# Patient Record
Sex: FEMALE | Race: WHITE | NOT HISPANIC OR LATINO | Employment: FULL TIME | URBAN - METROPOLITAN AREA
[De-identification: names, ages, dates, MRNs, and addresses within clinical notes are randomized per-mention and may not be internally consistent; named-entity substitution may affect disease eponyms.]

---

## 2017-04-16 ENCOUNTER — TELEPHONE (OUTPATIENT)
Dept: NURSING | Facility: CLINIC | Age: 47
End: 2017-04-16

## 2017-04-17 NOTE — TELEPHONE ENCOUNTER
Call Type: Triage Call    Presenting Problem: Solid red spot noted today in right eye. Pt  states she has coughed a lot today and moved/lifted heavy items.  Triage Note:  Guideline Title: Eye: Other Problems  Recommended Disposition: Provide Home/Self Care  Original Inclination: Wanted to speak with a nurse  Override Disposition:  Intended Action: Follow advice given  Physician Contacted: No  Bright red spot in white of eye following sneezing, minor trauma, or occurring  spontaneously ?  YES  Feeling of dry eyes with stinging, burning, or strained vision ? NO  Eye injury, possible foreign body, OR chemical splash ? NO  Sudden or severe eye pain and no injury ? NO  Sudden vision change ? NO  Gradual onset of decreased or absent blinking AND not previously evaluated ? NO  Repetitive, uncontrolled twitching or spasms of the eyelid(s) lasting more than 3  days AND not previously evaluated ? NO  Bulging of eyes AND not previously evaluated ? NO  Sudden vision change associated with new difficulty speaking/swallowing; using an  arm/leg; or new one-sided weakness (face drooping) ? NO  Chronic drooping of upper eyelid affecting vision AND not previously evaluated ? NO  Cut on or near eyelid OR near tear duct (corner of eye closest to nose) ? NO  Cuts, abrasions or puncture wound on face ? NO  Physician Instructions:  Care Advice: See provider immediately if you note increasing pain, any  change in vision, or light sensitivity.  SYMPTOM / CONDITION MANAGEMENT  Usually no tests or treatment are necessary. A broken blood vessel can be  caused by severe sneezing, coughing, vomiting or heavy lifting. Risk is  increased if having a medical history of diabetes, high blood pressure,  blood clotting disorder or if taking blood thinning medication.  See provider during regular office hours if blood does not disappear within  2 weeks, if it recurs or if you notice spontaneous bleeding or bruising in  other areas.

## 2017-05-31 ENCOUNTER — OFFICE VISIT (OUTPATIENT)
Dept: OBGYN | Facility: CLINIC | Age: 47
End: 2017-05-31
Payer: COMMERCIAL

## 2017-05-31 ENCOUNTER — RADIANT APPOINTMENT (OUTPATIENT)
Dept: MAMMOGRAPHY | Facility: CLINIC | Age: 47
End: 2017-05-31
Payer: COMMERCIAL

## 2017-05-31 VITALS
WEIGHT: 287 LBS | DIASTOLIC BLOOD PRESSURE: 78 MMHG | BODY MASS INDEX: 46.12 KG/M2 | HEART RATE: 80 BPM | HEIGHT: 66 IN | SYSTOLIC BLOOD PRESSURE: 122 MMHG

## 2017-05-31 DIAGNOSIS — Z12.31 VISIT FOR SCREENING MAMMOGRAM: ICD-10-CM

## 2017-05-31 DIAGNOSIS — Z11.51 SCREENING FOR HUMAN PAPILLOMAVIRUS: ICD-10-CM

## 2017-05-31 DIAGNOSIS — Z01.419 ENCOUNTER FOR GYNECOLOGICAL EXAMINATION WITHOUT ABNORMAL FINDING: Primary | ICD-10-CM

## 2017-05-31 DIAGNOSIS — N39.41 URGENCY INCONTINENCE: ICD-10-CM

## 2017-05-31 PROCEDURE — 99396 PREV VISIT EST AGE 40-64: CPT | Performed by: OBSTETRICS & GYNECOLOGY

## 2017-05-31 PROCEDURE — G0124 SCREEN C/V THIN LAYER BY MD: HCPCS | Performed by: OBSTETRICS & GYNECOLOGY

## 2017-05-31 PROCEDURE — 77063 BREAST TOMOSYNTHESIS BI: CPT | Mod: TC

## 2017-05-31 PROCEDURE — G0202 SCR MAMMO BI INCL CAD: HCPCS | Mod: TC

## 2017-05-31 PROCEDURE — G0145 SCR C/V CYTO,THINLAYER,RESCR: HCPCS | Performed by: OBSTETRICS & GYNECOLOGY

## 2017-05-31 RX ORDER — MIRABEGRON 25 MG/1
25 TABLET, FILM COATED, EXTENDED RELEASE ORAL DAILY
Qty: 30 TABLET | Refills: 3 | Status: SHIPPED | OUTPATIENT
Start: 2017-05-31 | End: 2017-12-29

## 2017-05-31 ASSESSMENT — ANXIETY QUESTIONNAIRES
5. BEING SO RESTLESS THAT IT IS HARD TO SIT STILL: NOT AT ALL
6. BECOMING EASILY ANNOYED OR IRRITABLE: NOT AT ALL
7. FEELING AFRAID AS IF SOMETHING AWFUL MIGHT HAPPEN: NOT AT ALL
3. WORRYING TOO MUCH ABOUT DIFFERENT THINGS: NOT AT ALL
1. FEELING NERVOUS, ANXIOUS, OR ON EDGE: NOT AT ALL
GAD7 TOTAL SCORE: 0
2. NOT BEING ABLE TO STOP OR CONTROL WORRYING: NOT AT ALL

## 2017-05-31 ASSESSMENT — PATIENT HEALTH QUESTIONNAIRE - PHQ9: 5. POOR APPETITE OR OVEREATING: NOT AT ALL

## 2017-05-31 NOTE — LETTER
June 9, 2017    Genesis Carnes  [address] [address]    Dear Genesis,  We are happy to inform you that your PAP smear result from 5/31/17 is normal.  We are now able to do a follow up test on PAP smears. The DNA test is for HPV (Human Papilloma Virus). Cervical cancer is closely linked with certain types of HPV. Your result showed no evidence of high risk HPV.  Therefore we recommend you return in 3 years for your next pap smear and HPV test.  You will still need to return to the clinic every year for an annual exam and other preventive tests.  Please contact the clinic at [phone] with any questions.  Sincerely,    Judson Elliott MD/elva

## 2017-05-31 NOTE — MR AVS SNAPSHOT
"              After Visit Summary   2017    Genesis Carnes    MRN: [id number]           Patient Information     Date Of Birth          1970        Visit Information        Provider Department      2017 9:00 AM Judson Elliott MD [facility]        Today's Diagnoses     Encounter for gynecological examination without abnormal finding    -  1    Urgency incontinence           Follow-ups after your visit        Who to contact     If you have questions or need follow up information about today's clinic visit or your schedule please contact [facility] directly at [phone].  Normal or non-critical lab and imaging results will be communicated to you by [employer], letter or phone within 4 business days after the clinic has received the results. If you do not hear from us within 7 days, please contact the clinic through [employer] or phone. If you have a critical or abnormal lab result, we will notify you by phone as soon as possible.  Submit refill requests through [employer] or call your pharmacy and they will forward the refill request to us. Please allow 3 business days for your refill to be completed.          Additional Information About Your Visit        MyChart Information     Splash lets you send messages to your doctor, view your test results, renew your prescriptions, schedule appointments and more. To sign up, go to [email or web address] . Click on \"Log in\" on the left side of the screen, which will take you to the Welcome page. Then click on \"Sign up Now\" on the right side of the page.     You will be asked to enter the access code listed below, as well as some personal information. Please follow the directions to create your username and password.     Your access code is: [email or web address]  Expires: 2017  8:55 AM     Your access code will  in 90 days. If you need help or a new code, please call your [facility] or [phone].        Care " "EveryWhere ID     This is your Care EveryWhere ID. This could be used by other organizations to access your [facility] medical records  [id number]        Your Vitals Were     Pulse Height Last Period BMI (Body Mass Index)          80 1.676 m (5' 6\") 05/17/2017 46.32 kg/m2         Blood Pressure from Last 3 Encounters:   05/31/17 122/78   01/19/16 141/80   06/18/14 120/59    Weight from Last 3 Encounters:   05/31/17 130.2 kg (287 lb)   01/19/16 113.4 kg (250 lb)   06/17/14 113.4 kg (250 lb)              We Performed the Following     Pap imaged thin layer screen reflex to HPV if ASCUS - recommended age 25 - 29 years          Today's Medication Changes          These changes are accurate as of: 5/31/17  9:41 AM.  If you have any questions, ask your nurse or doctor.               Start taking these medicines.        Dose/Directions    mirabegron 25 MG 24 hr tablet   Commonly known as:  MYRBETRIQ   Used for:  Urgency incontinence   Started by:  Judson Elliott MD        Dose:  25 mg   Take 1 tablet (25 mg) by mouth daily   Quantity:  30 tablet   Refills:  3            Where to get your medicines      These medications were sent to [place] [facility] [address] [place], [place] - [address]  [address] [facility] [address]     Phone:  [phone]     mirabegron 25 MG 24 hr tablet                Primary Care Provider    None Specified       No primary provider on file.        Thank you!     Thank you for choosing [facility] FOR [facility]  for your care. Our goal is always to provide you with excellent care. Hearing back from our patients is one way we can continue to improve our services. Please take a few minutes to complete the written survey that you may receive in the mail after your visit with us. Thank you!             Your Updated Medication List - Protect others around you: Learn how to safely use, store and throw away your medicines at www.disposemymeds.org.          This list is accurate as of: 5/31/17  9:41 AM. "  Always use your most recent med list.                   Brand Name Dispense Instructions for use    CELEXA PO          HUMALOG SC          HYDROCHLOROTHIAZIDE PO          LISINOPRIL PO          mirabegron 25 MG 24 hr tablet    MYRBETRIQ    30 tablet    Take 1 tablet (25 mg) by mouth daily       VITAMIN D (CHOLECALCIFEROL) PO      Take 5,000 Units by mouth daily

## 2017-05-31 NOTE — LETTER
June 14, 2017    Genesis Carnes  [address] [address]    Dear Genesis,  We are happy to inform you that your PAP smear result from 5/31/17 is normal.  The presence of endometrial cells was seen on your current pap smear.  This is most likely due to your menstrual cycle.  No follow up is recommended unless you have irregular periods or spotting/bleeding in between your cycles.  If this occurs, please contact the clinic for a follow up appointment.   We are now able to do a follow up test on PAP smears. The DNA test is for HPV (Human Papilloma Virus). Cervical cancer is closely linked with certain types of HPV. Your result showed no evidence of high risk HPV.  Therefore we recommend you return in 1 year for your next pap smear.  You will still need to return to the clinic every year for an annual exam and other preventive tests.  Please contact the clinic at [phone] with any questions.  Sincerely,    Judson Elliott MD/elva

## 2017-05-31 NOTE — PROGRESS NOTES
Genesis is a 47 year old No obstetric history on file. female who presents for annual exam.     Besides routine health maintenance, discuss bladder botox.    HPI: The patient is seen at this time for her annual update. She has not been seen in years. She has good diabetic control with low hemoglobin A1c. Unfortunately she has gone up and weight. She does have some urgency and stress incontinence. She does still have caffeine in her system.Pelvic Exam:    The patient sees a PCP at [facility].        GYNECOLOGIC HISTORY:    Patient's last menstrual period was 05/17/2017.  Her current contraception method is: none.  She  reports that she has never smoked. She does not have any smokeless tobacco history on file.    Patient is not sexually active.  STD testing offered?  Declined  Last PHQ-9 score on record =   PHQ-9 SCORE 5/31/2017   Total Score 2     Last GAD7 score on record =   PREM-7 SCORE 5/31/2017   Total Score 0     Alcohol Score = 1    HEALTH MAINTENANCE:  Cholesterol: couple mths ago-normal  Last Mammo: 2 years ago, Result: normal, Next Mammo:  today  Pap: (  Lab Results   Component Value Date    PAP NIL 10/16/2015     Colonoscopy:  4.5 yrs ago, Result: normal, Next Colonoscopy: 1 years.  Dexa:  never    Health maintenance updated:  no    HISTORY:  Obstetric History     No data available          There is no problem list on file for this patient.    No past surgical history on file.   Social History   Substance Use Topics     Smoking status: Never Smoker     Smokeless tobacco: Not on file     Alcohol use Yes      Comment: rarely           Current Outpatient Prescriptions   Medication Sig     HYDROCHLOROTHIAZIDE PO      VITAMIN D, CHOLECALCIFEROL, PO Take 5,000 Units by mouth daily     Insulin Lispro, Human, (HUMALOG SC)      LISINOPRIL PO      Citalopram Hydrobromide (CELEXA PO)      No current facility-administered medications for this visit.      No Known Allergies    Past medical, surgical, social and family  "histories were reviewed and updated in EPIC.    ROS:   12 point review of systems negative other than symptoms noted below.  Constitutional: Weight Gain  Genitourinary: Incontinence    EXAM:  /78  Pulse 80  Ht 5' 6\" (1.676 m)  Wt 287 lb (130.2 kg)  LMP 05/17/2017  BMI 46.32 kg/m2   BMI: Body mass index is 46.32 kg/(m^2).    PHYSICAL EXAM:  Constitutional:  Appearance: Well nourished, well developed, alert, in no acute distress  Neck:  Lymph Nodes:  No lymphadenopathy present    Thyroid:  Gland size normal, nontender, no nodules or masses present  on palpation  Chest:  Respiratory Effort:  Breathing unlabored  Cardiovascular:    Heart: Auscultation:  Regular rate, normal rhythm, no murmurs present  Breasts: Inspection of Breasts:  No lymphadenopathy present    Palpation of Breasts and Axillae:  No masses present on palpation, no  breast tenderness    Axillary Lymph Nodes:  No lymphadenopathy present  Gastrointestinal:   Abdominal Examination:  Abdomen nontender to palpation, tone normal without rigidity or guarding, no masses present, umbilicus without lesions   Liver and Spleen:  No hepatomegaly present, liver nontender to palpation    Hernias:  No hernias present  Lymphatic: Lymph Nodes:  No other lymphadenopathy present  Skin:  General Inspection:  No rashes present, no lesions present, no areas of  discoloration    Genitalia and Groin:  No rashes present, no lesions present, no areas of  discoloration, no masses present  Neurologic/Psychiatric:    Mental Status:  Oriented X3     Pelvic Exam:  External Genitalia:     Normal appearance for age, no discharge present, no tenderness present, no inflammatory lesions present, color normal  GOOD URETHRAL SUPPORT  Vagina:     Normal vaginal vault without central or paravaginal defects, no discharge present, no inflammatory lesions present, no masses present  Bladder:     Nontender to palpation  Urethra:   Urethral Body:  Urethra palpation normal, urethra " structural support normal   Urethral Meatus:  No erythema or lesions present  Cervix:     Appearance healthy, no lesions present, nontender to palpation, no bleeding present  Uterus:     Nontender to palpation, no masses present, position anteflexed, mobility: normal  Adnexa:     No adnexal tenderness present, no adnexal masses present  Perineum:     Perineum within normal limits, no evidence of trauma, no rashes or skin lesions present  Anus:     Anus within normal limits, no hemorrhoids present  Inguinal Lymph Nodes:     No lymphadenopathy present  Pubic Hair:     Normal pubic hair distribution for age  Genitalia and Groin:     No rashes present, no lesions present, no areas of discoloration, no masses present    COUNSELING:   Reviewed preventive health counseling, as reflected in patient instructions       Regular exercise       Healthy diet/nutrition    BMI: Body mass index is 46.32 kg/(m^2).  Weight management plan: Discussed healthy diet and exercise guidelines and patient will follow up in 12 months in clinic to re-evaluate.    ASSESSMENT:  47 year old female with satisfactory annual exam.    ICD-10-CM    1. Encounter for gynecological examination without abnormal finding Z01.419        PLAN: The patient is seen at this time for annual exam. Her health issues surrounding her weight. We will try some MYRBETRIQ for her urgency incontinence.      Judson Elliott MD

## 2017-05-31 NOTE — LETTER
June 14, 2017    Genesis Carnes  [address] [address]    Dear Genesis,  We are happy to inform you that your PAP smear result from 5/31/17 is normal.  We are now able to do a follow up test on PAP smears. The DNA test is for HPV (Human Papilloma Virus). Cervical cancer is closely linked with certain types of HPV. Your result showed no evidence of high risk HPV.  Therefore we recommend you return in 1 year for your next pap smear.  You will still need to return to the clinic every year for an annual exam and other preventive tests.  Please contact the clinic at [phone] with any questions.  Sincerely,    Judson Elliott MD/elva

## 2017-06-01 ASSESSMENT — PATIENT HEALTH QUESTIONNAIRE - PHQ9: SUM OF ALL RESPONSES TO PHQ QUESTIONS 1-9: 2

## 2017-06-01 ASSESSMENT — ANXIETY QUESTIONNAIRES: GAD7 TOTAL SCORE: 0

## 2017-06-05 LAB
COPATH REPORT: NORMAL
PAP: NORMAL

## 2017-06-06 PROCEDURE — 87624 HPV HI-RISK TYP POOLED RSLT: CPT | Performed by: OBSTETRICS & GYNECOLOGY

## 2017-06-08 LAB
FINAL DIAGNOSIS: NORMAL
HPV HR 12 DNA CVX QL NAA+PROBE: NEGATIVE
HPV16 DNA SPEC QL NAA+PROBE: NEGATIVE
HPV18 DNA SPEC QL NAA+PROBE: NEGATIVE
SPECIMEN DESCRIPTION: NORMAL

## 2017-12-28 ENCOUNTER — TELEPHONE (OUTPATIENT)
Dept: OBGYN | Facility: CLINIC | Age: 47
End: 2017-12-28

## 2017-12-28 DIAGNOSIS — R30.0 DYSURIA: Primary | ICD-10-CM

## 2017-12-28 NOTE — TELEPHONE ENCOUNTER
Pt needs a  Prior auth before her dexa scan on 2/5.  She would like a call back when it has been approved.

## 2017-12-29 RX ORDER — PHENAZOPYRIDINE HYDROCHLORIDE 100 MG/1
200 TABLET, FILM COATED ORAL 3 TIMES DAILY PRN
Qty: 12 TABLET | Refills: 1 | Status: SHIPPED | OUTPATIENT
Start: 2017-12-29 | End: 2018-01-02

## 2017-12-29 NOTE — TELEPHONE ENCOUNTER
Called pt back.     -pt no longer taking mirbetriq for her bladder as she felt it stopped her urine flow.    -she has c/o of bladder pain, dysruia, hotflashes, night sweats and feeling angry all the time. Her cycles have started to become irregular in the last 3 months.     I have sent pyridium for her dysuria. She needs to be seen in office.     She does not need a DEXA and I believe this was an error on our part.

## 2017-12-29 NOTE — TELEPHONE ENCOUNTER
Pt states insurance information is current. Reviewed with [facility] and she does not do PA's for dexa scan. Pt age 47. Dexa scan not ordered through this office. Is this something Dr. Elliott wants pt to have? Routing to Lisa Alvarado. Please advise.

## 2018-01-02 ENCOUNTER — OFFICE VISIT (OUTPATIENT)
Dept: OBGYN | Facility: CLINIC | Age: 48
End: 2018-01-02
Payer: COMMERCIAL

## 2018-01-02 VITALS — WEIGHT: 272 LBS | BODY MASS INDEX: 43.9 KG/M2 | DIASTOLIC BLOOD PRESSURE: 70 MMHG | SYSTOLIC BLOOD PRESSURE: 132 MMHG

## 2018-01-02 DIAGNOSIS — N94.3 PMS (PREMENSTRUAL SYNDROME): Primary | ICD-10-CM

## 2018-01-02 PROCEDURE — 99213 OFFICE O/P EST LOW 20 MIN: CPT | Performed by: NURSE PRACTITIONER

## 2018-01-02 RX ORDER — ATORVASTATIN CALCIUM 20 MG/1
TABLET, FILM COATED ORAL
COMMUNITY
Start: 2017-12-25 | End: 2023-09-16

## 2018-01-02 RX ORDER — ERGOCALCIFEROL 1.25 MG/1
CAPSULE, LIQUID FILLED ORAL
Refills: 1 | COMMUNITY
Start: 2017-11-21 | End: 2023-09-16

## 2018-01-02 RX ORDER — NORETHINDRONE ACETATE AND ETHINYL ESTRADIOL 1MG-20(21)
1 KIT ORAL DAILY
Qty: 84 TABLET | Refills: 3 | Status: SHIPPED | OUTPATIENT
Start: 2018-01-02 | End: 2019-07-25

## 2018-01-02 RX ORDER — PHENTERMINE HYDROCHLORIDE 37.5 MG/1
TABLET ORAL
Refills: 0 | COMMUNITY
Start: 2017-09-18 | End: 2018-01-02

## 2018-01-02 RX ORDER — MIRABEGRON 25 MG/1
TABLET, FILM COATED, EXTENDED RELEASE ORAL
Refills: 3 | COMMUNITY
Start: 2017-08-09 | End: 2018-01-02

## 2018-01-02 RX ORDER — FUROSEMIDE 20 MG
20 TABLET ORAL DAILY
Refills: 1 | COMMUNITY
Start: 2017-12-24

## 2018-01-02 NOTE — PROGRESS NOTES
SUBJECTIVE:                                                   Genesis Carnes is a 47 year old female who presents to clinic today for the following health issue(s):  Patient presents with:  Consult: hot flashes, and severe night sweats and mood swings      HPI:  Pt here today with complaints of hot flashes, night sweats, anger issues due to PMS. She has started to have irregular menses. She is s/p ablation.     Patient's last menstrual period was 12/24/2017 (exact date)..   Patient is not sexually active, No obstetric history on file..  Using oral contraceptives for contraception.    reports that she has never smoked. She has never used smokeless tobacco.      STD testing offered?  Declined    Health maintenance updated:  yes    Today's PHQ-2 Score: No flowsheet data found.  Today's PHQ-9 Score:   PHQ-9 SCORE 5/31/2017   Total Score 2     Today's PREM-7 Score:   PREM-7 SCORE 5/31/2017   Total Score 0       Problem list and histories reviewed & adjusted, as indicated.  Additional history: as documented.    Patient Active Problem List   Diagnosis   (none) - all problems resolved or deleted     No past surgical history on file.   Social History   Substance Use Topics     Smoking status: Never Smoker     Smokeless tobacco: Never Used     Alcohol use Yes      Comment: rarely           Current Outpatient Prescriptions   Medication Sig     atorvastatin (LIPITOR) 20 MG tablet      vitamin D (ERGOCALCIFEROL) 67774 UNIT capsule TAKE 1 CAPSULE BY MOUTH EVERY MONDAY AND THURSDAY.     ONETOUCH ULTRA test strip      furosemide (LASIX) 20 MG tablet TAKE 1 TABLET BY MOUTH EVERY MORNING.     norethindrone-ethinyl estradiol (JUNEL FE 1/20) 1-20 MG-MCG per tablet Take 1 tablet by mouth daily     HYDROCHLOROTHIAZIDE PO      Insulin Lispro, Human, (HUMALOG SC)      LISINOPRIL PO      Citalopram Hydrobromide (CELEXA PO)      No current facility-administered medications for this visit.      Allergies   Allergen Reactions      Pravastatin      Other reaction(s): Myalgia     Soybean Oil      Other reaction(s): GI Upset       ROS:  12 point review of systems negative other than symptoms noted below.  Genitourinary: Hot Flashes, Irregular Menses, Night Sweats, Painful Urination and Pelvic Pain    OBJECTIVE:     /70  Wt 272 lb (123.4 kg)  LMP 12/24/2017 (Exact Date)  Breastfeeding? No  BMI 43.9 kg/m2  Body mass index is 43.9 kg/(m^2).    Exam:  Constitutional:  Appearance: Well nourished, well developed alert, in no acute distress  Neurologic/Psychiatric:  Mental Status:  Oriented X3   No Pelvic Exam performed     In-Clinic Test Results:  No results found for this or any previous visit (from the past 24 hour(s)).    ASSESSMENT/PLAN:                                                        ICD-10-CM    1. PMS (premenstrual syndrome) N94.3 norethindrone-ethinyl estradiol (JUNEL FE 1/20) 1-20 MG-MCG per tablet       There are no Patient Instructions on file for this visit.    Will try OCP for premenopausal symptoms. M/R/B discussed. The only flag would be her weight. She understands she needs to call if she has any SOB, headaches, vision changes. RTC in May for annual exam.    No exam. Total time spent in face to face counseling 15 minutes.    YURY Chirinos CNP  [facility] FOR [facility]

## 2018-01-02 NOTE — MR AVS SNAPSHOT
"              After Visit Summary   2018    Genesis Carnes    MRN: [id number]           Patient Information     Date Of Birth          1970        Visit Information        Provider Department      2018 3:00 PM Lisa Alvarado APRN CNP [facility]        Today's Diagnoses     PMS (premenstrual syndrome)    -  1       Follow-ups after your visit        Who to contact     If you have questions or need follow up information about today's clinic visit or your schedule please contact [facility] directly at [phone].  Normal or non-critical lab and imaging results will be communicated to you by [employer], letter or phone within 4 business days after the clinic has received the results. If you do not hear from us within 7 days, please contact the clinic through [employer] or phone. If you have a critical or abnormal lab result, we will notify you by phone as soon as possible.  Submit refill requests through [employer] or call your pharmacy and they will forward the refill request to us. Please allow 3 business days for your refill to be completed.          Additional Information About Your Visit        MyChart Information     [employer] lets you send messages to your doctor, view your test results, renew your prescriptions, schedule appointments and more. To sign up, go to [email or web address] . Click on \"Log in\" on the left side of the screen, which will take you to the Welcome page. Then click on \"Sign up Now\" on the right side of the page.     You will be asked to enter the access code listed below, as well as some personal information. Please follow the directions to create your username and password.     Your access code is: [email or web address]  Expires: 2018  3:57 PM     Your access code will  in 90 days. If you need help or a new code, please call your [facility] clinic or [phone].        Care EveryWhere ID     This is your Care EveryWhere ID. This could be " used by other organizations to access your [facility] medical records  [id number]        Your Vitals Were     Last Period Breastfeeding? BMI (Body Mass Index)             12/24/2017 (Exact Date) No 43.9 kg/m2          Blood Pressure from Last 3 Encounters:   01/02/18 132/70   05/31/17 122/78   01/19/16 141/80    Weight from Last 3 Encounters:   01/02/18 272 lb (123.4 kg)   05/31/17 287 lb (130.2 kg)   01/19/16 250 lb (113.4 kg)              Today, you had the following     No orders found for display         Today's Medication Changes          These changes are accurate as of: 1/2/18  3:57 PM.  If you have any questions, ask your nurse or doctor.               Start taking these medicines.        Dose/Directions    norethindrone-ethinyl estradiol 1-20 MG-MCG per tablet   Commonly known as:  JUNEL FE 1/20   Used for:  PMS (premenstrual syndrome)   Started by:  Lisa Alvarado APRN CNP        Dose:  1 tablet   Take 1 tablet by mouth daily   Quantity:  84 tablet   Refills:  3            Where to get your medicines      These medications were sent to [facility] [address] [place] [facility] - [place], [place] - [address]  [address] [place] [address]     Phone:  [phone]     norethindrone-ethinyl estradiol 1-20 MG-MCG per tablet                Primary Care Provider Office Phone # Fax #    [facility] [phone]       [facility] [address]  [facility] [address]        Equal Access to Services     [facility] Campus[facility]IO : Hadii hung rosenthal Sojesus, waaxda luqadaha, qaybta kaalmacarina carrillo. So [facility] [phone].    ATENCIÓN: Si habla español, tiene a mendez disposición servicios gratuitos de asistencia lingüística. Llame al [phone].    We comply with applicable federal civil rights laws and [place] laws. We do not discriminate on the basis of race, color, national origin, age, disability, sex, sexual  orientation, or gender identity.            Thank you!     Thank you for choosing [facility] [place]  for your care. Our goal is always to provide you with excellent care. Hearing back from our patients is one way we can continue to improve our services. Please take a few minutes to complete the written survey that you may receive in the mail after your visit with us. Thank you!             Your Updated Medication List - Protect others around you: Learn how to safely use, store and throw away your medicines at www.disposemymeds.org.          This list is accurate as of: 1/2/18  3:57 PM.  Always use your most recent med list.                   Brand Name Dispense Instructions for use Diagnosis    atorvastatin 20 MG tablet    LIPITOR          CELEXA PO           furosemide 20 MG tablet    LASIX     TAKE 1 TABLET BY MOUTH EVERY MORNING.        HUMALOG SC           HYDROCHLOROTHIAZIDE PO           LISINOPRIL PO           norethindrone-ethinyl estradiol 1-20 MG-MCG per tablet    JUNEL FE 1/20    84 tablet    Take 1 tablet by mouth daily    PMS (premenstrual syndrome)       ONETOUCH ULTRA test strip   Generic drug:  blood glucose monitoring           vitamin D 45492 UNIT capsule    ERGOCALCIFEROL     TAKE 1 CAPSULE BY MOUTH EVERY MONDAY AND THURSDAY.

## 2018-05-02 ENCOUNTER — TRANSFERRED RECORDS (OUTPATIENT)
Dept: HEALTH INFORMATION MANAGEMENT | Facility: CLINIC | Age: 48
End: 2018-05-02

## 2019-07-17 NOTE — PROGRESS NOTES
Genesis is a 49 year old  female who presents for annual exam.     Besides routine health maintenance, she has no other health concerns today .    HPI: The patient is seen at this time for her annual exam.  Her diabetes is stable with a hemoglobin A1c of 6.3.  She complains of severe hot flashes night sweats insomnia and irritability.  The patient's PCP is Dr. Laughlin.        GYNECOLOGIC HISTORY:    Patient's last menstrual period was 2017 (exact date).  Her current contraception method is: not sexually active.  She  reports that she has never smoked. She has never used smokeless tobacco.    Patient is not sexually active.  STD testing offered?  Declined  Last PHQ-9 score on record =   PHQ-9 SCORE 2019   PHQ-9 Total Score 3     Last GAD7 score on record =   PREM-7 SCORE 2019   Total Score 0     Alcohol Score = 1    HEALTH MAINTENANCE:  Cholesterol: recently with PCP  Last Mammo: 17, Result: normal, Next Mammo: today   Pap:   Lab Results   Component Value Date    PAP OTHER-NIL, See Result 2017    PAP NIL 10/16/2015      Colonoscopy:  Last was last year, Result: normal, Next Colonoscopy: every few years due to family history.  Dexa:  never    Health maintenance updated:  yes    HISTORY:  OB History    Para Term  AB Living   2 2 2 0 0 2   SAB TAB Ectopic Multiple Live Births   0 0 0 0 2      # Outcome Date GA Lbr Curtis/2nd Weight Sex Delivery Anes PTL Lv   2 Term      -SEC   ALEX   1 Term         ALEX       Patient Active Problem List   Diagnosis   (none) - all problems resolved or deleted     History reviewed. No pertinent surgical history.   Social History     Tobacco Use     Smoking status: Never Smoker     Smokeless tobacco: Never Used   Substance Use Topics     Alcohol use: Yes     Comment: rarely           Current Outpatient Medications   Medication Sig     atorvastatin (LIPITOR) 20 MG tablet      Citalopram Hydrobromide (CELEXA PO)      furosemide (LASIX)  "20 MG tablet TAKE 1 TABLET BY MOUTH EVERY MORNING.     HYDROCHLOROTHIAZIDE PO      Insulin Lispro, Human, (HUMALOG SC)      LISINOPRIL PO      ONETOUCH ULTRA test strip      vitamin D (ERGOCALCIFEROL) 26253 UNIT capsule TAKE 1 CAPSULE BY MOUTH EVERY MONDAY AND THURSDAY.     No current facility-administered medications for this visit.      Allergies   Allergen Reactions     Pravastatin      Other reaction(s): Myalgia     Soybean Oil      Other reaction(s): GI Upset       Past medical, surgical, social and family histories were reviewed and updated in EPIC.    ROS:   12 point review of systems negative other than symptoms noted below.  Constitutional: Weight Gain  Genitourinary: Hot Flashes and No Periods  Psychiatric: Depression    EXAM:  /84   Pulse 80   Ht 1.664 m (5' 5.5\")   Wt 129.7 kg (286 lb)   LMP 12/24/2017 (Exact Date)   BMI 46.87 kg/m     BMI: Body mass index is 46.87 kg/m .    PHYSICAL EXAM:  Constitutional:  Appearance: Well nourished, well developed, alert, in no acute distress  Neck:  Lymph Nodes:  No lymphadenopathy present    Thyroid:  Gland size normal, nontender, no nodules or masses present  on palpation  Chest:  Respiratory Effort:  Breathing unlabored  Cardiovascular:    Heart: Auscultation:  Regular rate, normal rhythm, no murmurs present  Breasts: Inspection of Breasts:  No lymphadenopathy present., Palpation of Breasts and Axillae:  No masses present on palpation, no breast tenderness., Axillary Lymph Nodes:  No lymphadenopathy present. and No nodularity, asymmetry or nipple discharge bilaterally.  Gastrointestinal:   Abdominal Examination:  Abdomen nontender to palpation, tone normal without rigidity or guarding, no masses present, umbilicus without lesions   Liver and Spleen:  No hepatomegaly present, liver nontender to palpation    Hernias:  No hernias present  Lymphatic: Lymph Nodes:  No other lymphadenopathy present  Skin:  General Inspection:  No rashes present, no lesions " present, no areas of  discoloration    Genitalia and Groin:  No rashes present, no lesions present, no areas of  discoloration, no masses present  Neurologic/Psychiatric:    Mental Status:  Oriented X3     Pelvic Exam:  External Genitalia:     Normal appearance for age, no discharge present, no tenderness present, no inflammatory lesions present, color normal  Vagina:     Normal vaginal vault without central or paravaginal defects, no discharge present, no inflammatory lesions present, no masses present  Bladder:     Nontender to palpation  Urethra:   Urethral Body:  Urethra palpation normal, urethra structural support normal   Urethral Meatus:  No erythema or lesions present  Cervix:     Appearance healthy, no lesions present, nontender to palpation, no bleeding present  Uterus:     Uterus: firm, normal sized and nontender, midplane in position.   Adnexa:     No adnexal tenderness present, no adnexal masses present  Perineum:     Perineum within normal limits, no evidence of trauma, no rashes or skin lesions present  Anus:     Anus within normal limits, no hemorrhoids present  Inguinal Lymph Nodes:     No lymphadenopathy present  Pubic Hair:     Normal pubic hair distribution for age  Genitalia and Groin:     No rashes present, no lesions present, no areas of discoloration, no masses present      COUNSELING:   Reviewed preventive health counseling, as reflected in patient instructions       Regular exercise       Healthy diet/nutrition    BMI: Body mass index is 46.87 kg/m .  Weight management plan: Discussed healthy diet and exercise guidelines    ASSESSMENT:  49 year old female with satisfactory annual exam.    ICD-10-CM    1. Encounter for gynecological examination without abnormal finding Z01.419 Pap imaged thin layer screen with HPV - recommended age 30 - 65     HPV High Risk Types DNA Cervical       PLAN: The patient is seen at this time for annual exam.  She is very irritable and yet finally responsive to  the possibility of taking some estrogen replacement therapy.  The risks and complications were reviewed and accepted.  She is clearly menopausal and debilitated by her symptoms.    Judson Elliott MD

## 2019-07-25 ENCOUNTER — ANCILLARY PROCEDURE (OUTPATIENT)
Dept: MAMMOGRAPHY | Facility: CLINIC | Age: 49
End: 2019-07-25
Payer: COMMERCIAL

## 2019-07-25 ENCOUNTER — OFFICE VISIT (OUTPATIENT)
Dept: OBGYN | Facility: CLINIC | Age: 49
End: 2019-07-25
Payer: COMMERCIAL

## 2019-07-25 VITALS
HEIGHT: 66 IN | BODY MASS INDEX: 45.96 KG/M2 | WEIGHT: 286 LBS | SYSTOLIC BLOOD PRESSURE: 142 MMHG | DIASTOLIC BLOOD PRESSURE: 84 MMHG | HEART RATE: 80 BPM

## 2019-07-25 DIAGNOSIS — Z01.419 ENCOUNTER FOR GYNECOLOGICAL EXAMINATION WITHOUT ABNORMAL FINDING: Primary | ICD-10-CM

## 2019-07-25 DIAGNOSIS — Z12.31 VISIT FOR SCREENING MAMMOGRAM: ICD-10-CM

## 2019-07-25 DIAGNOSIS — N95.1 SYMPTOMS, SUCH AS FLUSHING, SLEEPLESSNESS, HEADACHE, LACK OF CONCENTRATION, ASSOCIATED WITH THE MENOPAUSE: ICD-10-CM

## 2019-07-25 DIAGNOSIS — E66.01 MORBID OBESITY (H): ICD-10-CM

## 2019-07-25 PROCEDURE — 77063 BREAST TOMOSYNTHESIS BI: CPT | Mod: TC

## 2019-07-25 PROCEDURE — G0145 SCR C/V CYTO,THINLAYER,RESCR: HCPCS | Performed by: OBSTETRICS & GYNECOLOGY

## 2019-07-25 PROCEDURE — 99396 PREV VISIT EST AGE 40-64: CPT | Performed by: OBSTETRICS & GYNECOLOGY

## 2019-07-25 PROCEDURE — 77067 SCR MAMMO BI INCL CAD: CPT | Mod: TC

## 2019-07-25 PROCEDURE — 87624 HPV HI-RISK TYP POOLED RSLT: CPT | Performed by: OBSTETRICS & GYNECOLOGY

## 2019-07-25 RX ORDER — NORETHINDRONE ACETATE AND ETHINYL ESTRADIOL .5; 2.5 MG/1; UG/1
1 TABLET ORAL DAILY
Qty: 84 TABLET | Refills: 3 | Status: SHIPPED | OUTPATIENT
Start: 2019-07-25 | End: 2020-07-27

## 2019-07-25 ASSESSMENT — ANXIETY QUESTIONNAIRES
1. FEELING NERVOUS, ANXIOUS, OR ON EDGE: NOT AT ALL
6. BECOMING EASILY ANNOYED OR IRRITABLE: NOT AT ALL
7. FEELING AFRAID AS IF SOMETHING AWFUL MIGHT HAPPEN: NOT AT ALL
IF YOU CHECKED OFF ANY PROBLEMS ON THIS QUESTIONNAIRE, HOW DIFFICULT HAVE THESE PROBLEMS MADE IT FOR YOU TO DO YOUR WORK, TAKE CARE OF THINGS AT HOME, OR GET ALONG WITH OTHER PEOPLE: NOT DIFFICULT AT ALL
2. NOT BEING ABLE TO STOP OR CONTROL WORRYING: NOT AT ALL
5. BEING SO RESTLESS THAT IT IS HARD TO SIT STILL: NOT AT ALL
GAD7 TOTAL SCORE: 0
3. WORRYING TOO MUCH ABOUT DIFFERENT THINGS: NOT AT ALL

## 2019-07-25 ASSESSMENT — MIFFLIN-ST. JEOR: SCORE: 1926.1

## 2019-07-25 ASSESSMENT — PATIENT HEALTH QUESTIONNAIRE - PHQ9
5. POOR APPETITE OR OVEREATING: NOT AT ALL
SUM OF ALL RESPONSES TO PHQ QUESTIONS 1-9: 3

## 2019-07-25 NOTE — Clinical Note
Please abstract the following data from this visit with this patient into the appropriate field in Epic:Colonoscopy done on this date: 5/2/18 (approximately), by this group: unsure, results were normal.

## 2019-07-25 NOTE — LETTER
August 6, 2019    Genesis EDGARDO Deyvi  [address] [facility]   [place] [address]    Dear ,  This letter is regarding your recent Pap smear (cervical cancer screening) and Human Papillomavirus (HPV) test.  We are happy to inform you that your Pap smear result is normal. Cervical cancer is closely linked with certain types of HPV. Your results showed no evidence of high-risk HPV.  We recommend you have your next PAP smear and HPV test in 3 years.  You will still need to return to the clinic every year for an annual exam and other preventive tests.  If you have additional questions regarding this result, please call our registered nurse, Svetlana at [phone].  Sincerely,    Judson Elliott MD.re

## 2019-07-25 NOTE — LETTER
August 6, 2019    Genesis EDGARDO Deyvi  [address] [facility]   [place] [address]    Dear ,  This letter is regarding your recent Pap smear (cervical cancer screening) and Human Papillomavirus (HPV) test.  We are happy to inform you that your Pap smear result is normal. Cervical cancer is closely linked with certain types of HPV. Your results showed no evidence of high-risk HPV.  We recommend you have your next PAP smear and HPV test in 3 years.  You will still need to return to the clinic every year for an annual exam and other preventive tests.  If you have additional questions regarding this result, please call our registered nurse, Svetlana at [phone].  Sincerely,    Judson Elliott MD.re

## 2019-07-25 NOTE — NURSING NOTE
Please abstract the following data from this visit with this patient into the appropriate field in Epic:    Colonoscopy done on this date: 5/2/18 (approximately), by this group: unsure, results were normal.

## 2019-07-26 ASSESSMENT — ANXIETY QUESTIONNAIRES: GAD7 TOTAL SCORE: 0

## 2019-08-01 LAB
COPATH REPORT: NORMAL
PAP: NORMAL

## 2019-08-02 LAB
FINAL DIAGNOSIS: NORMAL
HPV HR 12 DNA CVX QL NAA+PROBE: NEGATIVE
HPV16 DNA SPEC QL NAA+PROBE: NEGATIVE
HPV18 DNA SPEC QL NAA+PROBE: NEGATIVE
SPECIMEN DESCRIPTION: NORMAL
SPECIMEN SOURCE CVX/VAG CYTO: NORMAL

## 2019-11-19 ENCOUNTER — HOSPITAL ENCOUNTER (EMERGENCY)
Facility: CLINIC | Age: 49
Discharge: HOME | End: 2019-11-19
Attending: EMERGENCY MEDICINE | Admitting: EMERGENCY MEDICINE
Payer: COMMERCIAL

## 2019-11-19 VITALS
SYSTOLIC BLOOD PRESSURE: 117 MMHG | HEART RATE: 80 BPM | OXYGEN SATURATION: 100 % | DIASTOLIC BLOOD PRESSURE: 73 MMHG | TEMPERATURE: 96.8 F | RESPIRATION RATE: 22 BRPM

## 2019-11-19 DIAGNOSIS — E16.2 HYPOGLYCEMIA: ICD-10-CM

## 2019-11-19 LAB
GLUCOSE BLDC GLUCOMTR-MCNC: 147 MG/DL (ref 70–99)
GLUCOSE BLDC GLUCOMTR-MCNC: 188 MG/DL (ref 70–99)

## 2019-11-19 PROCEDURE — 99283 EMERGENCY DEPT VISIT LOW MDM: CPT

## 2019-11-19 PROCEDURE — 00000146 ZZHCL STATISTIC GLUCOSE BY METER IP

## 2019-11-19 PROCEDURE — 40000275 ZZH STATISTIC RCP TIME EA 10 MIN

## 2019-11-19 ASSESSMENT — ENCOUNTER SYMPTOMS
DYSURIA: 0
FREQUENCY: 0
SHORTNESS OF BREATH: 0
SINUS PAIN: 0
RHINORRHEA: 0
CHILLS: 1
SORE THROAT: 0

## 2019-11-19 NOTE — ED AVS SNAPSHOT
Emergency Department  [address] [place] [address]  Phone:  [phone]  Fax:  [phone]                                    Genesis Carnes   MRN: [id number]    Department:   Emergency Department   Date of Visit:  11/19/2019           After Visit Summary Signature Page    I have received my discharge instructions, and my questions have been answered. I have discussed any challenges I see with this plan with the nurse or doctor.    ..........................................................................................................................................  Patient/Patient Representative Signature      ..........................................................................................................................................  Patient Representative Print Name and Relationship to Patient    ..................................................               ................................................  Date                                   Time    ..........................................................................................................................................  Reviewed by Signature/Title    ...................................................              ..............................................  Date                                               Time          22EPIC Rev 08/18

## 2019-11-20 NOTE — ED NOTES
Bed: ST01  Expected date: 11/19/19  Expected time: 8:24 PM  Means of arrival:   Comments:  E1  54F Diabetes reaction/Glucagan/Unresponsive

## 2019-11-20 NOTE — ED TRIAGE NOTES
Patient found unresponsive at home, EMS attempted BG check which read low on the meter which is <20 according to EMS so she was given 1g glucagon IM after which respirations improved and BG increased to 33.

## 2019-11-20 NOTE — ED PROVIDER NOTES
History     Chief Complaint:  Hypoglycemia      HPI   Genesis Carnes is a 49 year old female with type 1 diabetes who presents with hypoglycemia. EMS notes that they were called after the patient's daughter found her unresponsive after coming home from work and found her on the ground and that the last time she was seen was before she went to work. EMS notes that she then was called and they initially found her unresponsive and administered a nasal airway and gave 1 mg of glucagon IM around 2005 and she was doing better and was taken off the airway. Here, she notes that she is cold and that this has happened before after taking too much insulin. She denies that she has any pain or recent sickness or illness, chest pain, shortness of breath, or dysuria.  She notes that she does state that she took her insulin tonight but that she forgot to eat dinner.    Allergies:  Pravastatin    Medications:    Atorvastatin  Celexa  Lasix  Hydrochlorothiazide  Insulin   Lisinopril      Past Medical History:    Obesity  Type 1 diabetes    Past Surgical History:    The patient does not have any pertinent past surgical history.     Family History:    No past pertinent family history.     Social History:  Smoking status: Never smoker  Alcohol use: Yes  Drug use: No  [facility]  Marital Status:  Single [1]     Review of Systems   Constitutional: Positive for chills.   HENT: Negative for congestion, rhinorrhea, sinus pain and sore throat.    Respiratory: Negative for shortness of breath.    Cardiovascular: Negative for chest pain.   Genitourinary: Negative for dysuria, frequency and urgency.   All other systems reviewed and are negative.      Physical Exam     Patient Vitals for the past 24 hrs:   BP Temp Temp src Pulse Heart Rate Resp SpO2   11/19/19 2028 117/73 96.8  F (36  C) Temporal 80 80 22 100 %        Physical Exam  General: Alert and cooperative with exam. Patient in mild distress. Normal  mentation.  Head:  Scalp is NC/AT  Eyes:  No scleral icterus, PERRL  ENT:  The external nose and ears are normal. The oropharynx is normal and without erythema; mucus membranes are moist. Uvula midline, no evidence of deep space infection.  Neck:  Normal range of motion without rigidity.  CV:  Regular rate and rhythm  Resp:  Breath sounds are clear bilaterally    Non-labored, no retractions or accessory muscle use  GI:  Abdomen is soft, no distension, no tenderness. No peritoneal signs. Obese.  MS:  No lower extremity edema   Skin:  Warm and dry, No rash or lesions noted.  Neuro: Oriented x 3. No gross motor deficits.      Emergency Department Course   Laboratory:  Laboratory findings were communicated with the patient who voiced understanding of the findings.    Glucose by meter (2047): 147(H)  Glucose by meter(2153): 188(H)    Procedures    Interventions:  2023 NS 1L IV Bolus       Emergency Department Course:   Nursing notes and vitals reviewed.    2020 I performed an exam of the patient as documented above.     2208 I personally reviewed the results with the patient and answered all related questions prior to discharge.    Impression & Plan      Medical Decision Making:  Patient is a 49-year-old female with history of type 1 diabetes who presents with episode of unresponsiveness and significant hypoglycemia.  Patient's medical history and records were reviewed.  On my assessment patient was seen in the stabilization room and was awake and conversant on arrival.  She was provided apple juice and recheck of patient's blood sugar which was 144.  Patient was offered but deferred additional assessment including IV palcement and had decision-making capacity in the ED.  She notes that she took her insulin though did not eat dinner and this is the likely cause of patient's hypoglycemic episode.  She was observed in the emergency department and had no further episodes of hypoglycemia and was asymptomatic at time of  discharge.  Recommended close follow-up with her endocrinologist if patient is having persistent issues controlling her blood sugar.  Return precautions discussed.  Patient discharged home with family.    Diagnosis:    ICD-10-CM    1. Hypoglycemia E16.2        Disposition:   The patient is discharged to home.     Scribe Disclosure:  I, Noa Hong, am serving as a scribe at 2020 on 11/19/2019 to document services personally performed by Neil Villegas DO based on my observations and the provider's statements to me.    EMERGENCY DEPARTMENT       Neil Villegas DO  11/20/19 9283

## 2020-07-24 DIAGNOSIS — N95.1 SYMPTOMS, SUCH AS FLUSHING, SLEEPLESSNESS, HEADACHE, LACK OF CONCENTRATION, ASSOCIATED WITH THE MENOPAUSE: ICD-10-CM

## 2020-07-27 RX ORDER — NORETHINDRONE ACETATE AND ETHINYL ESTRADIOL .5; 2.5 MG/1; UG/1
1 TABLET ORAL DAILY
Qty: 84 TABLET | Refills: 0 | Status: SHIPPED | OUTPATIENT
Start: 2020-07-27 | End: 2023-09-16

## 2020-07-27 NOTE — TELEPHONE ENCOUNTER
"Requested Prescriptions   Pending Prescriptions Disp Refills     FYAVOLV 0.5-2.5 MG-MCG tablet [Pharmacy Med Name: FYAVOLV 0.5-2.5MCG TABLETS 28S] 84 tablet 3     Sig: TAKE 1 TABLET BY MOUTH DAILY       Hormone Replacement Therapy Passed - 7/24/2020  3:36 PM        Passed - Blood pressure under 140/90 in past 12 months     BP Readings from Last 3 Encounters:   11/19/19 117/73   07/25/19 142/84   01/02/18 132/70                 Passed - Recent (12 mo) or future (30 days) visit within the authorizing provider's specialty     Patient has had an office visit with the authorizing provider or a provider within the authorizing providers department within the previous 12 mos or has a future within next 30 days. See \"Patient Info\" tab in inbasket, or \"Choose Columns\" in Meds & Orders section of the refill encounter.              Passed - Patient has mammogram in past 2 years on file if age 50-75        Passed - Medication is active on med list        Passed - Patient is 18 years of age or older        Passed - No active pregnancy on record        Passed - No positive pregnancy test on record in past 12 months           Last Written Prescription Date:  7/25/19  Last Fill Quantity: 84,  # refills: 3   Last office visit: 7/25/2019 with prescribing provider:  Lexi   Future Office Visit:        Medication is being filled for 1 time refill only due to:  Patient needs to be seen because due for appointment. Reminder note sent to pharmacy    Mary Grace Ocampo RN on 7/27/2020 at 10:24 AM        "

## 2020-10-17 DIAGNOSIS — N95.1 SYMPTOMS, SUCH AS FLUSHING, SLEEPLESSNESS, HEADACHE, LACK OF CONCENTRATION, ASSOCIATED WITH THE MENOPAUSE: ICD-10-CM

## 2020-10-19 RX ORDER — NORETHINDRONE ACETATE AND ETHINYL ESTRADIOL .0025; .5 MG/1; MG/1
TABLET, FILM COATED ORAL
Qty: 84 TABLET | Refills: 0 | OUTPATIENT
Start: 2020-10-19

## 2020-10-19 NOTE — TELEPHONE ENCOUNTER
"Requested Prescriptions   Pending Prescriptions Disp Refills     FYAVOLV 0.5-2.5 MG-MCG tablet [Pharmacy Med Name: FYAVOLV 0.5-2.5MCG TABLETS 28S] 84 tablet 0     Sig: TAKE 1 TABLET BY MOUTH DAILY       Hormone Replacement Therapy Failed - 10/17/2020  3:11 AM        Failed - Recent (12 mo) or future (30 days) visit within the authorizing provider's specialty     Patient has had an office visit with the authorizing provider or a provider within the authorizing providers department within the previous 12 mos or has a future within next 30 days. See \"Patient Info\" tab in inbasket, or \"Choose Columns\" in Meds & Orders section of the refill encounter.              Passed - Blood pressure under 140/90 in past 12 months     BP Readings from Last 3 Encounters:   11/19/19 117/73   07/25/19 142/84   01/02/18 132/70                 Passed - Patient has mammogram in past 2 years on file if age 50-75        Passed - Medication is active on med list        Passed - Patient is 18 years of age or older        Passed - No active pregnancy on record        Passed - No positive pregnancy test on record in past 12 months           Last Written Prescription Date:  7/27/20  Last Fill Quantity: 84,  # refills: 0   Last office visit: 7/25/2019 with prescribing provider:  Lexi   Future Office Visit:  None    Pt due for annual, no appt scheduled. Pt already received one month extension. Rx denied.   Tata Carlisle RN on 10/19/2020 at 9:49 AM          "

## 2021-01-08 ENCOUNTER — HOSPITAL ENCOUNTER (OUTPATIENT)
Dept: MAMMOGRAPHY | Facility: CLINIC | Age: 51
Discharge: HOME | End: 2021-01-08
Attending: INTERNAL MEDICINE | Admitting: INTERNAL MEDICINE
Payer: COMMERCIAL

## 2021-01-08 DIAGNOSIS — Z12.31 VISIT FOR SCREENING MAMMOGRAM: ICD-10-CM

## 2021-01-08 PROCEDURE — 77067 SCR MAMMO BI INCL CAD: CPT

## 2021-01-19 DIAGNOSIS — N95.1 SYMPTOMS, SUCH AS FLUSHING, SLEEPLESSNESS, HEADACHE, LACK OF CONCENTRATION, ASSOCIATED WITH THE MENOPAUSE: ICD-10-CM

## 2021-01-19 RX ORDER — NORETHINDRONE ACETATE AND ETHINYL ESTRADIOL .0025; .5 MG/1; MG/1
TABLET, FILM COATED ORAL
Qty: 84 TABLET | Refills: 0 | OUTPATIENT
Start: 2021-01-19

## 2021-01-19 NOTE — TELEPHONE ENCOUNTER
Pt due for annual, no appt scheduled. Pt already received one month extension. Rx denied.   Lisa Chavez RN on 1/19/2021 at 2:56 PM

## 2021-01-19 NOTE — TELEPHONE ENCOUNTER
"Requested Prescriptions   Pending Prescriptions Disp Refills     FYAVOLV 0.5-2.5 MG-MCG tablet [Pharmacy Med Name: FYAVOLV 0.5-2.5MCG TABLETS 28S] 84 tablet 0     Sig: TAKE 1 TABLET BY MOUTH DAILY       Hormone Replacement Therapy Failed - 1/19/2021  2:33 PM        Failed - Blood pressure under 140/90 in past 12 months     BP Readings from Last 3 Encounters:   11/19/19 117/73   07/25/19 142/84   01/02/18 132/70                 Failed - Recent (12 mo) or future (30 days) visit within the authorizing provider's specialty     Patient has had an office visit with the authorizing provider or a provider within the authorizing providers department within the previous 12 mos or has a future within next 30 days. See \"Patient Info\" tab in inbasket, or \"Choose Columns\" in Meds & Orders section of the refill encounter.              Passed - Patient has mammogram in past 2 years on file if age 50-75        Passed - Medication is active on med list        Passed - Patient is 18 years of age or older        Passed - No active pregnancy on record        Passed - No positive pregnancy test on record in past 12 months           Last Written Prescription Date:  7/27/20  Last Fill Quantity: 84,  # refills: 0   Last office visit: 7/25/2019 with prescribing provider:  Dr martínez   Future Office Visit:  None          "

## 2021-01-30 DIAGNOSIS — N95.1 SYMPTOMS, SUCH AS FLUSHING, SLEEPLESSNESS, HEADACHE, LACK OF CONCENTRATION, ASSOCIATED WITH THE MENOPAUSE: ICD-10-CM

## 2021-02-01 RX ORDER — NORETHINDRONE ACETATE AND ETHINYL ESTRADIOL .0025; .5 MG/1; MG/1
TABLET, FILM COATED ORAL
Qty: 84 TABLET | Refills: 0 | OUTPATIENT
Start: 2021-02-01

## 2021-02-01 NOTE — TELEPHONE ENCOUNTER
"Requested Prescriptions   Pending Prescriptions Disp Refills     FYAVOLV 0.5-2.5 MG-MCG tablet [Pharmacy Med Name: FYAVOLV 0.5-2.5MCG TABLETS 28S] 84 tablet 0     Sig: TAKE 1 TABLET BY MOUTH DAILY       Hormone Replacement Therapy Failed - 1/30/2021  8:17 PM        Failed - Blood pressure under 140/90 in past 12 months     BP Readings from Last 3 Encounters:   11/19/19 117/73   07/25/19 142/84   01/02/18 132/70                 Failed - Recent (12 mo) or future (30 days) visit within the authorizing provider's specialty     Patient has had an office visit with the authorizing provider or a provider within the authorizing providers department within the previous 12 mos or has a future within next 30 days. See \"Patient Info\" tab in inbasket, or \"Choose Columns\" in Meds & Orders section of the refill encounter.              Passed - Patient has mammogram in past 2 years on file if age 50-75        Passed - Medication is active on med list        Passed - Patient is 18 years of age or older        Passed - No active pregnancy on record        Passed - No positive pregnancy test on record in past 12 months           Pt due for annual, no appt scheduled. Pt already received one month extension. Rx denied.   Elisa Jha RN on 2/1/2021 at 8:32 AM    "

## 2023-03-10 ENCOUNTER — APPOINTMENT (OUTPATIENT)
Dept: CT IMAGING | Facility: CLINIC | Age: 53
End: 2023-03-10
Attending: EMERGENCY MEDICINE

## 2023-03-10 ENCOUNTER — HOSPITAL ENCOUNTER (EMERGENCY)
Facility: CLINIC | Age: 53
Discharge: LEFT BEFORE DISCHARGE | End: 2023-03-10
Attending: EMERGENCY MEDICINE | Admitting: EMERGENCY MEDICINE

## 2023-03-10 ENCOUNTER — APPOINTMENT (OUTPATIENT)
Dept: GENERAL RADIOLOGY | Facility: CLINIC | Age: 53
End: 2023-03-10
Attending: EMERGENCY MEDICINE

## 2023-03-10 VITALS
DIASTOLIC BLOOD PRESSURE: 61 MMHG | SYSTOLIC BLOOD PRESSURE: 133 MMHG | OXYGEN SATURATION: 98 % | RESPIRATION RATE: 25 BRPM | TEMPERATURE: 97.5 F | HEART RATE: 75 BPM

## 2023-03-10 DIAGNOSIS — R41.82 ALTERED MENTAL STATUS, UNSPECIFIED ALTERED MENTAL STATUS TYPE: ICD-10-CM
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
## 2023-03-10 DIAGNOSIS — G93.41 METABOLIC ENCEPHALOPATHY: ICD-10-CM

## 2023-03-10 DIAGNOSIS — E16.2 HYPOGLYCEMIA: ICD-10-CM

## 2023-03-10 LAB
ALBUMIN SERPL BCG-MCNC: 4.1 G/DL (ref 3.5–5.2)
ALP SERPL-CCNC: 179 U/L (ref 35–104)
ALT SERPL W P-5'-P-CCNC: 53 U/L (ref 10–35)
ANION GAP SERPL CALCULATED.3IONS-SCNC: 11 MMOL/L (ref 7–15)
AST SERPL W P-5'-P-CCNC: 28 U/L (ref 10–35)
ATRIAL RATE - MUSE: 85 BPM
BILIRUB SERPL-MCNC: 0.3 MG/DL
BUN SERPL-MCNC: 19.1 MG/DL (ref 6–20)
CALCIUM SERPL-MCNC: 11.2 MG/DL (ref 8.6–10)
CHLORIDE SERPL-SCNC: 99 MMOL/L (ref 98–107)
CREAT SERPL-MCNC: 0.71 MG/DL (ref 0.51–0.95)
DEPRECATED HCO3 PLAS-SCNC: 28 MMOL/L (ref 22–29)
DIASTOLIC BLOOD PRESSURE - MUSE: NORMAL MMHG
ETHANOL SERPL-MCNC: <0.01 G/DL
GFR SERPL CREATININE-BSD FRML MDRD: >90 ML/MIN/1.73M2
GLUCOSE BLDC GLUCOMTR-MCNC: 119 MG/DL (ref 70–99)
GLUCOSE BLDC GLUCOMTR-MCNC: 132 MG/DL (ref 70–99)
GLUCOSE BLDC GLUCOMTR-MCNC: 21 MG/DL (ref 70–99)
GLUCOSE BLDC GLUCOMTR-MCNC: 51 MG/DL (ref 70–99)
GLUCOSE BLDC GLUCOMTR-MCNC: 78 MG/DL (ref 70–99)
GLUCOSE BLDC GLUCOMTR-MCNC: 92 MG/DL (ref 70–99)
GLUCOSE SERPL-MCNC: 80 MG/DL (ref 70–99)
INTERPRETATION ECG - MUSE: NORMAL
MAGNESIUM SERPL-MCNC: 1.8 MG/DL (ref 1.7–2.3)
P AXIS - MUSE: 68 DEGREES
POTASSIUM SERPL-SCNC: 3.1 MMOL/L (ref 3.4–5.3)
PR INTERVAL - MUSE: 144 MS
PROT SERPL-MCNC: 6.9 G/DL (ref 6.4–8.3)
QRS DURATION - MUSE: 110 MS
QT - MUSE: 394 MS
QTC - MUSE: 468 MS
R AXIS - MUSE: 54 DEGREES
SODIUM SERPL-SCNC: 138 MMOL/L (ref 136–145)
SYSTOLIC BLOOD PRESSURE - MUSE: NORMAL MMHG
T AXIS - MUSE: 40 DEGREES
TROPONIN T SERPL HS-MCNC: 7 NG/L
TSH SERPL DL<=0.005 MIU/L-ACNC: 1.3 UIU/ML (ref 0.3–4.2)
VENTRICULAR RATE- MUSE: 85 BPM

## 2023-03-10 PROCEDURE — 72125 CT NECK SPINE W/O DYE: CPT

## 2023-03-10 PROCEDURE — 82962 GLUCOSE BLOOD TEST: CPT

## 2023-03-10 PROCEDURE — 84484 ASSAY OF TROPONIN QUANT: CPT | Performed by: EMERGENCY MEDICINE

## 2023-03-10 PROCEDURE — 80053 COMPREHEN METABOLIC PANEL: CPT | Performed by: EMERGENCY MEDICINE

## 2023-03-10 PROCEDURE — 258N000001 HC RX 258

## 2023-03-10 PROCEDURE — 82077 ASSAY SPEC XCP UR&BREATH IA: CPT | Performed by: EMERGENCY MEDICINE

## 2023-03-10 PROCEDURE — 93005 ELECTROCARDIOGRAM TRACING: CPT

## 2023-03-10 PROCEDURE — 70450 CT HEAD/BRAIN W/O DYE: CPT

## 2023-03-10 PROCEDURE — 83735 ASSAY OF MAGNESIUM: CPT | Performed by: EMERGENCY MEDICINE

## 2023-03-10 PROCEDURE — 71046 X-RAY EXAM CHEST 2 VIEWS: CPT

## 2023-03-10 PROCEDURE — 84443 ASSAY THYROID STIM HORMONE: CPT | Performed by: EMERGENCY MEDICINE

## 2023-03-10 PROCEDURE — 36415 COLL VENOUS BLD VENIPUNCTURE: CPT | Performed by: EMERGENCY MEDICINE

## 2023-03-10 PROCEDURE — 99285 EMERGENCY DEPT VISIT HI MDM: CPT | Mod: 25

## 2023-03-10 RX ORDER — DEXTROSE MONOHYDRATE 25 G/50ML
INJECTION, SOLUTION INTRAVENOUS
Status: COMPLETED
Start: 2023-03-10 | End: 2023-03-10

## 2023-03-10 RX ADMIN — DEXTROSE MONOHYDRATE 50 ML: 25 INJECTION, SOLUTION INTRAVENOUS at 15:40

## 2023-03-10 ASSESSMENT — ENCOUNTER SYMPTOMS
ARTHRALGIAS: 0
DYSURIA: 0
ABDOMINAL PAIN: 0
BACK PAIN: 0
NECK PAIN: 0

## 2023-03-10 ASSESSMENT — ACTIVITIES OF DAILY LIVING (ADL)
ADLS_ACUITY_SCORE: 35
ADLS_ACUITY_SCORE: 35
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
## 2023-03-10 NOTE — ED NOTES
BG of 51. MD Rodriguez notified. Food given and apple juice. Pt drinking soda from bag. Pt denies wanting amp of D50

## 2023-03-10 NOTE — ED NOTES
Pt recheck for EMS was 134 and ems STATED pt was fine. Our recheck was 17 - dextrose given . Pt refusing to get changed into a gown initially .

## 2023-03-11 NOTE — ED NOTES
MD Rodriguez updated with BGs of 119 then 78 roughly 30 mins later.     MD Rodriguez spoke with pt about staying d/t variable and low BGs. Pt refuses. AMA papers printed and signed.

## 2023-09-16 ENCOUNTER — HOSPITAL ENCOUNTER (OUTPATIENT)
Facility: CLINIC | Age: 53
Setting detail: OBSERVATION
Discharge: HOME | End: 2023-09-17
Attending: EMERGENCY MEDICINE | Admitting: INTERNAL MEDICINE
Payer: COMMERCIAL

## 2023-09-16 ENCOUNTER — APPOINTMENT (OUTPATIENT)
Dept: ULTRASOUND IMAGING | Facility: CLINIC | Age: 53
End: 2023-09-16
Attending: EMERGENCY MEDICINE
Payer: COMMERCIAL

## 2023-09-16 DIAGNOSIS — R00.0 TACHYCARDIA: ICD-10-CM

## 2023-09-16 DIAGNOSIS — N61.1 ABSCESS OF RIGHT BREAST: ICD-10-CM

## 2023-09-16 LAB
ALBUMIN SERPL BCG-MCNC: 4.1 G/DL (ref 3.5–5.2)
ALP SERPL-CCNC: 194 U/L (ref 35–104)
ALT SERPL W P-5'-P-CCNC: 40 U/L (ref 0–50)
ANION GAP SERPL CALCULATED.3IONS-SCNC: 12 MMOL/L (ref 7–15)
AST SERPL W P-5'-P-CCNC: 56 U/L (ref 0–45)
BASOPHILS # BLD AUTO: 0.1 10E3/UL (ref 0–0.2)
BASOPHILS NFR BLD AUTO: 0 %
BILIRUB SERPL-MCNC: 0.4 MG/DL
BUN SERPL-MCNC: 12.9 MG/DL (ref 6–20)
CALCIUM SERPL-MCNC: 11.1 MG/DL (ref 8.6–10)
CHLORIDE SERPL-SCNC: 98 MMOL/L (ref 98–107)
CREAT SERPL-MCNC: 0.69 MG/DL (ref 0.51–0.95)
DEPRECATED HCO3 PLAS-SCNC: 27 MMOL/L (ref 22–29)
EGFRCR SERPLBLD CKD-EPI 2021: >90 ML/MIN/1.73M2
EOSINOPHIL # BLD AUTO: 0.1 10E3/UL (ref 0–0.7)
EOSINOPHIL NFR BLD AUTO: 1 %
ERYTHROCYTE [DISTWIDTH] IN BLOOD BY AUTOMATED COUNT: 12.8 % (ref 10–15)
GLUCOSE SERPL-MCNC: 140 MG/DL (ref 70–99)
HCO3 BLDV-SCNC: 30 MMOL/L (ref 21–28)
HCT VFR BLD AUTO: 40.1 % (ref 35–47)
HGB BLD-MCNC: 13.6 G/DL (ref 11.7–15.7)
IMM GRANULOCYTES # BLD: 0.1 10E3/UL
IMM GRANULOCYTES NFR BLD: 0 %
LACTATE BLD-SCNC: 1.9 MMOL/L
LYMPHOCYTES # BLD AUTO: 2.3 10E3/UL (ref 0.8–5.3)
LYMPHOCYTES NFR BLD AUTO: 15 %
MCH RBC QN AUTO: 31.1 PG (ref 26.5–33)
MCHC RBC AUTO-ENTMCNC: 33.9 G/DL (ref 31.5–36.5)
MCV RBC AUTO: 92 FL (ref 78–100)
MONOCYTES # BLD AUTO: 0.9 10E3/UL (ref 0–1.3)
MONOCYTES NFR BLD AUTO: 6 %
NEUTROPHILS # BLD AUTO: 12.2 10E3/UL (ref 1.6–8.3)
NEUTROPHILS NFR BLD AUTO: 78 %
NRBC # BLD AUTO: 0 10E3/UL
NRBC BLD AUTO-RTO: 0 /100
PCO2 BLDV: 42 MM HG (ref 40–50)
PH BLDV: 7.47 [PH] (ref 7.32–7.43)
PLATELET # BLD AUTO: 305 10E3/UL (ref 150–450)
PO2 BLDV: 47 MM HG (ref 25–47)
POTASSIUM SERPL-SCNC: 3.3 MMOL/L (ref 3.4–5.3)
PROT SERPL-MCNC: 6.9 G/DL (ref 6.4–8.3)
RBC # BLD AUTO: 4.37 10E6/UL (ref 3.8–5.2)
SAO2 % BLDV: 85 % (ref 94–100)
SODIUM SERPL-SCNC: 137 MMOL/L (ref 136–145)
WBC # BLD AUTO: 15.6 10E3/UL (ref 4–11)

## 2023-09-16 PROCEDURE — 82803 BLOOD GASES ANY COMBINATION: CPT

## 2023-09-16 PROCEDURE — 96365 THER/PROPH/DIAG IV INF INIT: CPT

## 2023-09-16 PROCEDURE — 99223 1ST HOSP IP/OBS HIGH 75: CPT | Performed by: INTERNAL MEDICINE

## 2023-09-16 PROCEDURE — 258N000003 HC RX IP 258 OP 636: Performed by: EMERGENCY MEDICINE

## 2023-09-16 PROCEDURE — 87040 BLOOD CULTURE FOR BACTERIA: CPT | Performed by: EMERGENCY MEDICINE

## 2023-09-16 PROCEDURE — 85025 COMPLETE CBC W/AUTO DIFF WBC: CPT | Performed by: EMERGENCY MEDICINE

## 2023-09-16 PROCEDURE — 99285 EMERGENCY DEPT VISIT HI MDM: CPT | Mod: 25

## 2023-09-16 PROCEDURE — 96361 HYDRATE IV INFUSION ADD-ON: CPT

## 2023-09-16 PROCEDURE — G0378 HOSPITAL OBSERVATION PER HR: HCPCS

## 2023-09-16 PROCEDURE — 76642 ULTRASOUND BREAST LIMITED: CPT | Mod: RT

## 2023-09-16 PROCEDURE — 83605 ASSAY OF LACTIC ACID: CPT

## 2023-09-16 PROCEDURE — 96360 HYDRATION IV INFUSION INIT: CPT | Mod: 59

## 2023-09-16 PROCEDURE — 250N000011 HC RX IP 250 OP 636: Performed by: EMERGENCY MEDICINE

## 2023-09-16 PROCEDURE — 80053 COMPREHEN METABOLIC PANEL: CPT | Performed by: EMERGENCY MEDICINE

## 2023-09-16 PROCEDURE — 36415 COLL VENOUS BLD VENIPUNCTURE: CPT | Performed by: EMERGENCY MEDICINE

## 2023-09-16 RX ORDER — ATORVASTATIN CALCIUM 20 MG/1
40 TABLET, FILM COATED ORAL DAILY
COMMUNITY

## 2023-09-16 RX ORDER — CITALOPRAM HYDROBROMIDE 40 MG/1
40 TABLET ORAL DAILY
COMMUNITY

## 2023-09-16 RX ORDER — LISINOPRIL 2.5 MG/1
5 TABLET ORAL DAILY
COMMUNITY

## 2023-09-16 RX ORDER — INSULIN LISPRO 100 [IU]/ML
INJECTION, SOLUTION INTRAVENOUS; SUBCUTANEOUS
Status: ON HOLD | COMMUNITY
End: 2023-11-11

## 2023-09-16 RX ORDER — CHOLECALCIFEROL (VITAMIN D3) 50 MCG
1 TABLET ORAL DAILY
COMMUNITY

## 2023-09-16 RX ORDER — HYDROCHLOROTHIAZIDE 50 MG/1
50 TABLET ORAL DAILY
Status: ON HOLD | COMMUNITY
End: 2023-11-11

## 2023-09-16 RX ADMIN — SODIUM CHLORIDE 1000 ML: 9 INJECTION, SOLUTION INTRAVENOUS at 19:59

## 2023-09-16 RX ADMIN — VANCOMYCIN HYDROCHLORIDE 2500 MG: 10 INJECTION, POWDER, LYOPHILIZED, FOR SOLUTION INTRAVENOUS at 23:12

## 2023-09-16 ASSESSMENT — ACTIVITIES OF DAILY LIVING (ADL)
ADLS_ACUITY_SCORE: 35
ADLS_ACUITY_SCORE: 35

## 2023-09-16 NOTE — ED TRIAGE NOTES
Patient has a wound to her right lower abdomen. States popped while at work and pus drained from it. Noticed wound last night. Denies fever.

## 2023-09-17 VITALS
HEIGHT: 67 IN | BODY MASS INDEX: 45.52 KG/M2 | OXYGEN SATURATION: 100 % | RESPIRATION RATE: 16 BRPM | HEART RATE: 92 BPM | DIASTOLIC BLOOD PRESSURE: 50 MMHG | TEMPERATURE: 99.9 F | SYSTOLIC BLOOD PRESSURE: 112 MMHG | WEIGHT: 290 LBS

## 2023-09-17 PROCEDURE — 250N000013 HC RX MED GY IP 250 OP 250 PS 637: Performed by: INTERNAL MEDICINE

## 2023-09-17 PROCEDURE — 96366 THER/PROPH/DIAG IV INF ADDON: CPT

## 2023-09-17 PROCEDURE — G0378 HOSPITAL OBSERVATION PER HR: HCPCS

## 2023-09-17 PROCEDURE — 99207 PR NO BILLABLE SERVICE THIS VISIT: CPT | Performed by: INTERNAL MEDICINE

## 2023-09-17 RX ORDER — SULFAMETHOXAZOLE/TRIMETHOPRIM 800-160 MG
1 TABLET ORAL 2 TIMES DAILY
Status: DISCONTINUED | OUTPATIENT
Start: 2023-09-17 | End: 2023-09-17 | Stop reason: HOSPADM

## 2023-09-17 RX ORDER — PROCHLORPERAZINE 25 MG
25 SUPPOSITORY, RECTAL RECTAL EVERY 12 HOURS PRN
Status: CANCELLED | OUTPATIENT
Start: 2023-09-17

## 2023-09-17 RX ORDER — ONDANSETRON 4 MG/1
4 TABLET, ORALLY DISINTEGRATING ORAL EVERY 6 HOURS PRN
Status: CANCELLED | OUTPATIENT
Start: 2023-09-17

## 2023-09-17 RX ORDER — PROCHLORPERAZINE MALEATE 10 MG
10 TABLET ORAL EVERY 6 HOURS PRN
Status: CANCELLED | OUTPATIENT
Start: 2023-09-17

## 2023-09-17 RX ORDER — POLYETHYLENE GLYCOL 3350 17 G/17G
17 POWDER, FOR SOLUTION ORAL DAILY PRN
Status: CANCELLED | OUTPATIENT
Start: 2023-09-17

## 2023-09-17 RX ORDER — ACETAMINOPHEN 325 MG/1
650 TABLET ORAL EVERY 6 HOURS PRN
Status: CANCELLED | OUTPATIENT
Start: 2023-09-17

## 2023-09-17 RX ORDER — SODIUM CHLORIDE, SODIUM LACTATE, POTASSIUM CHLORIDE, CALCIUM CHLORIDE 600; 310; 30; 20 MG/100ML; MG/100ML; MG/100ML; MG/100ML
INJECTION, SOLUTION INTRAVENOUS CONTINUOUS
Status: CANCELLED | OUTPATIENT
Start: 2023-09-17

## 2023-09-17 RX ORDER — DEXTROSE MONOHYDRATE 25 G/50ML
25-50 INJECTION, SOLUTION INTRAVENOUS
Status: CANCELLED | OUTPATIENT
Start: 2023-09-17

## 2023-09-17 RX ORDER — ACETAMINOPHEN 650 MG/1
650 SUPPOSITORY RECTAL EVERY 6 HOURS PRN
Status: CANCELLED | OUTPATIENT
Start: 2023-09-17

## 2023-09-17 RX ORDER — NICOTINE POLACRILEX 4 MG
15-30 LOZENGE BUCCAL
Status: CANCELLED | OUTPATIENT
Start: 2023-09-17

## 2023-09-17 RX ORDER — BISACODYL 10 MG
10 SUPPOSITORY, RECTAL RECTAL DAILY PRN
Status: CANCELLED | OUTPATIENT
Start: 2023-09-17

## 2023-09-17 RX ORDER — ONDANSETRON 2 MG/ML
4 INJECTION INTRAMUSCULAR; INTRAVENOUS EVERY 6 HOURS PRN
Status: CANCELLED | OUTPATIENT
Start: 2023-09-17

## 2023-09-17 RX ORDER — POTASSIUM CHLORIDE 1500 MG/1
40 TABLET, EXTENDED RELEASE ORAL ONCE
Status: COMPLETED | OUTPATIENT
Start: 2023-09-17 | End: 2023-09-17

## 2023-09-17 RX ADMIN — POTASSIUM CHLORIDE 40 MEQ: 1500 TABLET, EXTENDED RELEASE ORAL at 01:53

## 2023-09-17 ASSESSMENT — ACTIVITIES OF DAILY LIVING (ADL): ADLS_ACUITY_SCORE: 35

## 2023-09-17 NOTE — DISCHARGE SUMMARY
See admission H&P for full details and admission/discharge diagnoses.      Prior to patient being sent up to floor, she communicated to RN she did not want to stay for observation.    Met with patient at bedside. Discussed at length with her risks of leaving including worsening infection including up to severe sepsis and death, dysglycemia in the setting of infection, positive blood cultures requiring return to ED. Discussed formal radiology report had not yet returned. After extension discussion, patient expressed understanding of risks and elected to discharge home. She reports follow-up scheduled with PCP on 9/21/23. Prescribed trimethoprim-sulfamethoxazole DS BID for 10 days. She may be referred to general surgery per her home health system / PCP preference if ongoing concerns.     Madhu Buchanan MD   Hospitalist

## 2023-09-17 NOTE — H&P
[facility]    History and Physical - Hospitalist Service       Date of Admission:  9/16/2023    Assessment & Plan   Genesis Carnes is a 53 year old female with past medical history including diabetes mellitus type 1, hyperparathyroidism, multinodular goiter, obesity, hypertension, hyperlipidemia who presents with breast drainage. Admitted on 9/16/2023.     Breast abscess with cellulitis, right   *Presents with right breast wound, erythema and purulent drainage  *US breast read pending formal radiology read, tech notes indicate no residual fluid collection and no ongoing purulence expressed on exam   *WBC 15.6, . Lactic acid normal and does not appear toxic/septic on exam.   *Case discussed with general surgery in ED, recommended TMP-SMX and outpatient follow-up with surgery; though due to elevated WBC, tachycardia and DM1 observation admission was requested by ED provider to ensure improvement  - Vancomycin IV with pharmacy to dose. Anticipate two doses and transition to TMP-SMX 9/18/23 PM if improving and BC negative  - Blood cultures in process  - Repeat WBC in AM    Diabetes mellitus, type 1  HgbA1c 6.7% 8/17/23. Prior to admission on NPH and sliding scale insulin, follows a highly variable dosing regimen based on her blood sugars. Reports checking her blood sugars frequently at home, up to once every hour.   - Discussed at length with patient unable to follow her home regimen given her variable, frequent insulin dosing that is dependent on her interpretation and can not be reflected in a standing order.   - NPH 20 units daily  - Begin insulin pump upon return home as instructed by endocrinology   - Medium sliding scale insulin   - Hypoglycemia protocol     Depression and anxiety  - Resume prior to admission citalopram when dose confirmed by pharmacy     Hypertension  Chronic lower extremity edema  - Resume prior to admission lisinopril when dose confirmed by pharmacy  - Hold  "furosemide in favor of IVF    Hyperlipidemia  - Hold prior to admission statin while observation status    Hyperparathyroidism   Hypercalcemia  Multinodular goiter  *Follows with endocrinology, note from 9/7/23 reviewed  *Calcium 11.1 which appears to be her baseline per [facility]   - Continue outpatient follow-up with endocrinology     Obesity  Body mass index is 45.42 kg/m .. Increase in all-cause morbidity and mortality. Encourage weight loss.     Hypokalemia  Potassium 3.3. PTA on furosemide, likely contributing.   - Potassium 40 meq PO x1  - Repeat BMP in AM        Diet: Moderate consistent carbohydrate diet  DVT Prophylaxis: Observation patient, short stay anticipated   Driscoll Catheter: Not present  Code Status: Full code     Disposition Plan   [place] to observation status. Anticipate discharge within 24 hours if clinically improved.     Entered: Madhu Buchanan MD 09/16/2023, 11:54 PM     The patient's care was discussed with the ED provider, patient         Clinically Significant Risk Factors Present on Admission        # Hypokalemia: Lowest K = 3.3 mmol/L in last 2 days, will replace as needed    # Hypercalcemia: Highest Ca = 11.1 mg/dL in last 2 days, will monitor as appropriate        # Hypertension: Home medication list includes antihypertensive(s)      # Severe Obesity: Estimated body mass index is 45.42 kg/m  as calculated from the following:    Height as of this encounter: 1.702 m (5' 7\").    Weight as of this encounter: 131.5 kg (290 lb).                   Madhu Buchanan MD  [facility]    ______________________________________________________________________    Chief Complaint   Breast drainage    History of Present Illness   Genesis Carnes is a 53 year old female who presents with the above chief complaint.    History is obtained from the patient, discussion with ED provider and review of medical record. The patient reports she first noted a breast lump 9/15. She " "had increased swelling and redness on 9/16/23 and her wound opened up with purulent drainage. She felt \"infected\", with some mild chills, denies any measured fevers. She has been eating and drinking normally.     In the Emergency Department, the patient was seen by Dr. Moreno, with whom I discussed the patient's presenting symptoms and emergency department course.  Initial vital signs were a temperature of 99.8F, , /69, RR 22, SpO2 94% on room air. Pertinent work-up as noted in A&P. The patient received IV vancomycin and Hospitalists were contacted for admission to the hospital.     Past Medical History    I have reviewed this patient's medical history and updated it with pertinent information if needed.   Past Medical History:   Diagnosis Date    Papanicolaou smear of cervix with low grade squamous intraepithelial lesion (LGSIL) 1/16/2015    1/16/15 LSIL 10/16/15 NIL/Neg HPV 5/31/17 NIL/Neg HPV       Past Surgical History   I have reviewed this patient's surgical history and updated it with pertinent information if needed.  No past surgical history on file.      Social History   I have reviewed this patient's social history and updated it with pertinent information if needed.  Social History     Tobacco Use    Smoking status: Never    Smokeless tobacco: Never   Substance Use Topics    Alcohol use: Yes     Comment: rarely    Drug use: No      Lives in [place]       Prior to Admission Medications  - Pending pharmacy reconciliation   Prior to Admission Medications   Prescriptions Last Dose Informant Patient Reported? Taking?   atorvastatin (LIPITOR) 20 MG tablet   Yes No   Sig: Take 40 mg by mouth daily   citalopram (CELEXA) 40 MG tablet   Yes No   Sig: Take 40 mg by mouth daily   furosemide (LASIX) 20 MG tablet   Yes No   Sig: Take 20 mg by mouth daily   hydrochlorothiazide (HYDRODIURIL) 50 MG tablet   Yes No   Sig: Take 50 mg by mouth daily   insulin lispro (HUMALOG KWIKPEN) 100 UNIT/ML (1 unit dial) " KWIKPEN   Yes No   Sig: Inject Subcutaneous 3 times daily (before meals)   lisinopril (ZESTRIL) 2.5 MG tablet   Yes No   Sig: Take 2.5 mg by mouth daily   vitamin D3 (CHOLECALCIFEROL) 50 mcg (2000 units) tablet   Yes No   Sig: Take 1 tablet by mouth daily      Facility-Administered Medications: None     Allergies   Allergies   Allergen Reactions    Pravastatin      Other reaction(s): Myalgia    Soybean Oil      Other reaction(s): GI Upset       Physical Exam   Vital Signs: Temp: 99.9  F (37.7  C) Temp src: Oral BP: 128/67 Pulse: 104   Resp: 22 SpO2: 100 % O2 Device: None (Room air)    Weight: 290 lbs 0 oz    Constitutional: Well-appearing, NAD  Eyes: PERRL, EOMI  HENT: Oropharynx clear, MMM  Respiratory: Clear to auscultation bilaterally, good air movement, normal effort   Cardiovascular: RRR, no m/r/g. Trace bilateral lower extremity edema  GI: Soft, non-tender, non-distended. No rebound tenderness or guarding.    Skin: Warm, dry. Right breast with open wound at 7 o'clock with surrounding mild erythema and induration (examined in presence of female chaperone)   Neurologic: Alert. Responding to questions appropriately. Following commands.    Psychiatric: Normal affect, appropriate      Data   Data reviewed today: I reviewed all medications, new labs and imaging results over the last 24 hours. I personally reviewed no images or EKG's today.    Recent Labs   Lab 09/16/23 1958   WBC 15.6*   HGB 13.6   MCV 92         POTASSIUM 3.3*   CHLORIDE 98   CO2 27   BUN 12.9   CR 0.69   ANIONGAP 12   VY 11.1*   *   ALBUMIN 4.1   PROTTOTAL 6.9   BILITOTAL 0.4   ALKPHOS 194*   ALT 40   AST 56*       No results found for this or any previous visit (from the past 24 hour(s)).

## 2023-09-17 NOTE — ED PROVIDER NOTES
"  History     Chief Complaint:  Wound Check       HPI   Genesis Carnes is a 53 year old female with history of hypertension, hyperlipidemia, and type 1 diabetes who presents with a wound check. The patient reports that she has a mildly painful wound under her right breast that she noticed last night. She explains that today at work pus drained from the wound site which prompted her visit to the ED. Genesis also endorses a low grade fever. The patient's blood sugar levels have been running normally for her, with her last recorded level being 107 today.      Independent Historian:   None - Patient Only    Review of External Notes:   I reviewed the patient's office visit note from 9/7, history of type 1 diabetes with recent history of cellulitis       Medications:    Lipitor  Celexa  Lasix  Hydrochlorothiazide  Lisinopril  Insulin    Past Medical History:  Morbid obesity  Bilateral leg edema  Diffuse goiter  Cellulitis  HTN  HLD  Hypokalemia  Type 1 diabetes    Physical Exam   Patient Vitals for the past 24 hrs:   BP Temp Temp src Pulse Resp SpO2 Height Weight   09/16/23 2236 128/67 -- -- 104 -- 100 % -- --   09/16/23 1848 (!) 148/69 99.8  F (37.7  C) Temporal 119 22 94 % 1.702 m (5' 7\") 131.5 kg (290 lb)        Physical Exam  Patient declined nurse chaperone    General: Laying on the ED bed  HEENT: Normocephalic, atraumatic  Cardiac: Warm and well perfused, regular tachycardia  Pulm: Breathing comfortably, no accessory muscle usage, no conversational dyspnea, and lungs clear bilaterally  MSK: No bony deformities  Skin: Warm and dry, erythema and wound with central eschar over the lower outer right breast with bloody drainage on the gauze dressing  Neuro: Moves all extremities  Psych: Pleasant mood and affect    Emergency Department Course     Emergency Department Course & Assessments:      Interventions:  Medications   sodium chloride (PF) 0.9% PF flush 3 mL (has no administration in time range)   sodium chloride " (PF) 0.9% PF flush 3 mL (has no administration in time range)   vancomycin (VANCOCIN) 2,500 mg in 0.9% NaCl 500 mL intermittent infusion (has no administration in time range)   sodium chloride 0.9% BOLUS 1,000 mL (0 mLs Intravenous Stopped 23)        Assessments:   I obtained history and examined the patient as noted above   I rechecked the patient and explained findings    Independent Interpretation (X-rays, CTs, rhythm strip):  None    Consultations/Discussion of Management or Tests:   I spoke with Dr. Juárez, general surgery, regarding the patient   I spoke with Dr. Buchanan, hospitalist, who accepts the patient       Social Determinants of Health affecting care:   None    Disposition:  The patient was admitted to the hospital under the care of Dr. Buchanan.     Impression & Plan      Medical Decision Makin-year-old diabetic female presents with right breast wound and drainage as above.  Exam is consistent with an abscess, tachycardic on initial vital signs.  Sepsis work-up ordered and lab work shows leukocytosis, lactate of 1.9.  Breast ultrasound shows cellulitis, no retained abscess fluid collection.  The patient was given a liter bolus of saline and remained tachycardic.  Given her type 1 diabetes, tachycardia, borderline lab work, I do believe that IV antibiotics are warranted.  As such, plan is for admission to the hospitalist service for further care.      Diagnosis:    ICD-10-CM    1. Abscess of right breast  N61.1       2. Tachycardia  R00.0              Scribe Disclosure:  I, Saulo Burciaga, am serving as a scribe at 7:17 PM on 2023 to document services personally performed by Colt Moreno MD based on my observations and the provider's statements to me.   2023   Colt Moreno MD King, Colin, MD  [phone]

## 2023-09-17 NOTE — DISCHARGE INSTRUCTIONS
Follow-up with primary care provider as scheduled. Primary care provider can refer to surgical specialist if needed for additional follow-up if needed. Return to ED if infection worsening (recurrent drainage, fever) or unable to tolerate oral antibiotics.

## 2023-09-17 NOTE — ED NOTES
[facility]  ED Nurse Handoff Report    ED Chief complaint: Wound Check      ED Diagnosis:   Final diagnoses:   Abscess of right breast   Tachycardia       Code Status: Full Code    Allergies:   Allergies   Allergen Reactions    Pravastatin      Other reaction(s): Myalgia    Soybean Oil      Other reaction(s): GI Upset       Patient Story: pt with hx of type 1 DM reports wound under right breast which she noted yesterday - at work today, pt states she noted purulent drainage and foul odor. Low grade fever in ED with lactic of 1.9. pt remains tachycardic 100-110 after fluids. Ultrasound done, iv vanco given  Focused Assessment:  see above    Treatments and/or interventions provided:   Results for orders placed or performed during the hospital encounter of 09/16/23   Comprehensive metabolic panel     Status: Abnormal   Result Value Ref Range    Sodium 137 136 - 145 mmol/L    Potassium 3.3 (L) 3.4 - 5.3 mmol/L    Chloride 98 98 - 107 mmol/L    Carbon Dioxide (CO2) 27 22 - 29 mmol/L    Anion Gap 12 7 - 15 mmol/L    Urea Nitrogen 12.9 6.0 - 20.0 mg/dL    Creatinine 0.69 0.51 - 0.95 mg/dL    Calcium 11.1 (H) 8.6 - 10.0 mg/dL    Glucose 140 (H) 70 - 99 mg/dL    Alkaline Phosphatase 194 (H) 35 - 104 U/L    AST 56 (H) 0 - 45 U/L    ALT 40 0 - 50 U/L    Protein Total 6.9 6.4 - 8.3 g/dL    Albumin 4.1 3.5 - 5.2 g/dL    Bilirubin Total 0.4 <=1.2 mg/dL    GFR Estimate >90 >60 mL/min/1.73m2   CBC with platelets and differential     Status: Abnormal   Result Value Ref Range    WBC Count 15.6 (H) 4.0 - 11.0 10e3/uL    RBC Count 4.37 3.80 - 5.20 10e6/uL    Hemoglobin 13.6 11.7 - 15.7 g/dL    Hematocrit 40.1 35.0 - 47.0 %    MCV 92 78 - 100 fL    MCH 31.1 26.5 - 33.0 pg    MCHC 33.9 31.5 - 36.5 g/dL    RDW 12.8 10.0 - 15.0 %    Platelet Count 305 150 - 450 10e3/uL    % Neutrophils 78 %    % Lymphocytes 15 %    % Monocytes 6 %    % Eosinophils 1 %    % Basophils 0 %    % Immature Granulocytes 0 %    NRBCs per 100 WBC 0  "<1 /100    Absolute Neutrophils 12.2 (H) 1.6 - 8.3 10e3/uL    Absolute Lymphocytes 2.3 0.8 - 5.3 10e3/uL    Absolute Monocytes 0.9 0.0 - 1.3 10e3/uL    Absolute Eosinophils 0.1 0.0 - 0.7 10e3/uL    Absolute Basophils 0.1 0.0 - 0.2 10e3/uL    Absolute Immature Granulocytes 0.1 <=0.4 10e3/uL    Absolute NRBCs 0.0 10e3/uL   iStat Gases (lactate) venous, POCT     Status: Abnormal   Result Value Ref Range    Lactic Acid POCT 1.9 <=2.0 mmol/L    Bicarbonate Venous POCT 30 (H) 21 - 28 mmol/L    O2 Sat, Venous POCT 85 (L) 94 - 100 %    pCO2 Venous POCT 42 40 - 50 mm Hg    pH Venous POCT 7.47 (H) 7.32 - 7.43    pO2 Venous POCT 47 25 - 47 mm Hg   CBC with platelets differential     Status: Abnormal    Narrative    The following orders were created for panel order CBC with platelets differential.  Procedure                               Abnormality         Status                     ---------                               -----------         ------                     CBC with platelets and d...[[id number]]  Abnormal            Final result                 Please view results for these tests on the individual orders.       Patient's response to treatments and/or interventions: fair    To be done/followed up on inpatient unit:  monitor for sepsis, antibiotic regimen    Does this patient have any cognitive concerns?: none    Activity level - Baseline/Home:  none  Activity Level - Current:   Independent    Patient's Preferred language: English   Needed?: No    Isolation: None  Infection: Not Applicable  Patient tested for COVID 19 prior to admission: NO  Bariatric?: No    Vital Signs:   Vitals:    09/16/23 1848 09/16/23 2236 09/16/23 2322   BP: (!) 148/69 128/67    Pulse: 119 104    Resp: 22     Temp: 99.8  F (37.7  C)  99.9  F (37.7  C)   TempSrc: Temporal  Oral   SpO2: 94% 100%    Weight: 131.5 kg (290 lb)     Height: 1.702 m (5' 7\")         Cardiac Rhythm:     Was the PSS-3 completed:   Yes  What interventions are " required if any?               Family Comments: n/a  OBS brochure/video discussed/provided to patient/family: N/A              Name of person given brochure if not patient: n/a              Relationship to patient: n/a    For the majority of the shift this patient's behavior was Green.   Behavioral interventions performed were reassurance and information.    ED NURSE PHONE NUMBER: *[phone]

## 2023-09-17 NOTE — PROGRESS NOTES
RECEIVING UNIT ED HANDOFF REVIEW    ED Nurse Handoff Report was reviewed by: Domo Cool RN on September 17, 2023 at 12:28 AM

## 2023-09-22 LAB
BACTERIA BLD CULT: NO GROWTH
BACTERIA BLD CULT: NO GROWTH

## 2023-11-10 ENCOUNTER — APPOINTMENT (OUTPATIENT)
Dept: ULTRASOUND IMAGING | Facility: CLINIC | Age: 53
End: 2023-11-10
Attending: EMERGENCY MEDICINE
Payer: COMMERCIAL

## 2023-11-10 ENCOUNTER — HOSPITAL ENCOUNTER (OUTPATIENT)
Facility: CLINIC | Age: 53
Setting detail: OBSERVATION
Discharge: HOME | End: 2023-11-11
Attending: EMERGENCY MEDICINE | Admitting: STUDENT IN AN ORGANIZED HEALTH CARE EDUCATION/TRAINING PROGRAM
Payer: COMMERCIAL

## 2023-11-10 DIAGNOSIS — E87.6 HYPOKALEMIA: ICD-10-CM

## 2023-11-10 DIAGNOSIS — E66.01 MORBID OBESITY (H): ICD-10-CM

## 2023-11-10 DIAGNOSIS — N61.0 MASTITIS OF LEFT BREAST UNRELATED TO PREGNANCY OR BREASTFEEDING: ICD-10-CM

## 2023-11-10 DIAGNOSIS — N61.1 ABSCESS OF LEFT BREAST: ICD-10-CM

## 2023-11-10 DIAGNOSIS — E16.2 HYPOGLYCEMIA: ICD-10-CM

## 2023-11-10 DIAGNOSIS — E10.649 TYPE 1 DIABETES MELLITUS WITH HYPOGLYCEMIA AND WITHOUT COMA (H): ICD-10-CM

## 2023-11-10 DIAGNOSIS — N61.0 CELLULITIS OF FEMALE BREAST: ICD-10-CM

## 2023-11-10 DIAGNOSIS — N61.1 ABSCESS OF RIGHT BREAST: Primary | ICD-10-CM

## 2023-11-10 LAB
ANION GAP SERPL CALCULATED.3IONS-SCNC: 11 MMOL/L (ref 7–15)
BASOPHILS # BLD AUTO: ABNORMAL 10*3/UL
BASOPHILS NFR BLD AUTO: ABNORMAL %
BUN SERPL-MCNC: 16.6 MG/DL (ref 6–20)
CALCIUM SERPL-MCNC: 11.4 MG/DL (ref 8.6–10)
CHLORIDE SERPL-SCNC: 99 MMOL/L (ref 98–107)
CREAT SERPL-MCNC: 0.68 MG/DL (ref 0.51–0.95)
DEPRECATED HCO3 PLAS-SCNC: 29 MMOL/L (ref 22–29)
EGFRCR SERPLBLD CKD-EPI 2021: >90 ML/MIN/1.73M2
EOSINOPHIL # BLD AUTO: ABNORMAL 10*3/UL
EOSINOPHIL NFR BLD AUTO: ABNORMAL %
ERYTHROCYTE [DISTWIDTH] IN BLOOD BY AUTOMATED COUNT: 13 % (ref 10–15)
GLUCOSE BLDC GLUCOMTR-MCNC: 125 MG/DL (ref 70–99)
GLUCOSE BLDC GLUCOMTR-MCNC: 133 MG/DL (ref 70–99)
GLUCOSE BLDC GLUCOMTR-MCNC: 30 MG/DL (ref 70–99)
GLUCOSE BLDC GLUCOMTR-MCNC: 75 MG/DL (ref 70–99)
GLUCOSE BLDC GLUCOMTR-MCNC: 84 MG/DL (ref 70–99)
GLUCOSE SERPL-MCNC: 26 MG/DL (ref 70–99)
HCT VFR BLD AUTO: 41.1 % (ref 35–47)
HGB BLD-MCNC: 13.8 G/DL (ref 11.7–15.7)
IMM GRANULOCYTES # BLD: ABNORMAL 10*3/UL
IMM GRANULOCYTES NFR BLD: ABNORMAL %
LACTATE SERPL-SCNC: 0.9 MMOL/L (ref 0.7–2)
LYMPHOCYTES # BLD AUTO: ABNORMAL 10*3/UL
LYMPHOCYTES NFR BLD AUTO: ABNORMAL %
MCH RBC QN AUTO: 31.5 PG (ref 26.5–33)
MCHC RBC AUTO-ENTMCNC: 33.6 G/DL (ref 31.5–36.5)
MCV RBC AUTO: 94 FL (ref 78–100)
MONOCYTES # BLD AUTO: ABNORMAL 10*3/UL
MONOCYTES NFR BLD AUTO: ABNORMAL %
NEUTROPHILS # BLD AUTO: ABNORMAL 10*3/UL
NEUTROPHILS NFR BLD AUTO: ABNORMAL %
NRBC # BLD AUTO: 0 10E3/UL
NRBC BLD AUTO-RTO: 0 /100
PLATELET # BLD AUTO: 377 10E3/UL (ref 150–450)
POTASSIUM SERPL-SCNC: 3 MMOL/L (ref 3.4–5.3)
RBC # BLD AUTO: 4.38 10E6/UL (ref 3.8–5.2)
SODIUM SERPL-SCNC: 139 MMOL/L (ref 135–145)
WBC # BLD AUTO: 15.7 10E3/UL (ref 4–11)

## 2023-11-10 PROCEDURE — 96376 TX/PRO/DX INJ SAME DRUG ADON: CPT

## 2023-11-10 PROCEDURE — 250N000013 HC RX MED GY IP 250 OP 250 PS 637: Performed by: EMERGENCY MEDICINE

## 2023-11-10 PROCEDURE — 85014 HEMATOCRIT: CPT | Performed by: EMERGENCY MEDICINE

## 2023-11-10 PROCEDURE — 96368 THER/DIAG CONCURRENT INF: CPT

## 2023-11-10 PROCEDURE — 76642 ULTRASOUND BREAST LIMITED: CPT | Mod: LT

## 2023-11-10 PROCEDURE — 99223 1ST HOSP IP/OBS HIGH 75: CPT | Performed by: STUDENT IN AN ORGANIZED HEALTH CARE EDUCATION/TRAINING PROGRAM

## 2023-11-10 PROCEDURE — G0378 HOSPITAL OBSERVATION PER HR: HCPCS

## 2023-11-10 PROCEDURE — 99285 EMERGENCY DEPT VISIT HI MDM: CPT | Mod: 25

## 2023-11-10 PROCEDURE — 87040 BLOOD CULTURE FOR BACTERIA: CPT | Performed by: EMERGENCY MEDICINE

## 2023-11-10 PROCEDURE — 83605 ASSAY OF LACTIC ACID: CPT | Performed by: EMERGENCY MEDICINE

## 2023-11-10 PROCEDURE — 96375 TX/PRO/DX INJ NEW DRUG ADDON: CPT

## 2023-11-10 PROCEDURE — 258N000001 HC RX 258: Performed by: EMERGENCY MEDICINE

## 2023-11-10 PROCEDURE — 82962 GLUCOSE BLOOD TEST: CPT

## 2023-11-10 PROCEDURE — 36415 COLL VENOUS BLD VENIPUNCTURE: CPT | Performed by: EMERGENCY MEDICINE

## 2023-11-10 PROCEDURE — 96365 THER/PROPH/DIAG IV INF INIT: CPT

## 2023-11-10 PROCEDURE — 258N000001 HC RX 258

## 2023-11-10 PROCEDURE — 96361 HYDRATE IV INFUSION ADD-ON: CPT

## 2023-11-10 PROCEDURE — 250N000011 HC RX IP 250 OP 636: Performed by: EMERGENCY MEDICINE

## 2023-11-10 PROCEDURE — 83036 HEMOGLOBIN GLYCOSYLATED A1C: CPT | Performed by: STUDENT IN AN ORGANIZED HEALTH CARE EDUCATION/TRAINING PROGRAM

## 2023-11-10 PROCEDURE — 85007 BL SMEAR W/DIFF WBC COUNT: CPT | Performed by: EMERGENCY MEDICINE

## 2023-11-10 PROCEDURE — 80048 BASIC METABOLIC PNL TOTAL CA: CPT | Performed by: EMERGENCY MEDICINE

## 2023-11-10 RX ORDER — POTASSIUM CHLORIDE 7.45 MG/ML
10 INJECTION INTRAVENOUS ONCE
Status: COMPLETED | OUTPATIENT
Start: 2023-11-10 | End: 2023-11-10

## 2023-11-10 RX ORDER — AMPICILLIN AND SULBACTAM 2; 1 G/1; G/1
3 INJECTION, POWDER, FOR SOLUTION INTRAMUSCULAR; INTRAVENOUS ONCE
Status: COMPLETED | OUTPATIENT
Start: 2023-11-10 | End: 2023-11-10

## 2023-11-10 RX ORDER — DEXTROSE MONOHYDRATE 25 G/50ML
INJECTION, SOLUTION INTRAVENOUS
Status: COMPLETED
Start: 2023-11-10 | End: 2023-11-10

## 2023-11-10 RX ORDER — AMPICILLIN AND SULBACTAM 2; 1 G/1; G/1
3 INJECTION, POWDER, FOR SOLUTION INTRAMUSCULAR; INTRAVENOUS EVERY 6 HOURS
Status: DISCONTINUED | OUTPATIENT
Start: 2023-11-11 | End: 2023-11-10

## 2023-11-10 RX ORDER — DEXTROSE MONOHYDRATE, SODIUM CHLORIDE, AND POTASSIUM CHLORIDE 50; 1.49; 9 G/1000ML; G/1000ML; G/1000ML
INJECTION, SOLUTION INTRAVENOUS CONTINUOUS
Status: DISCONTINUED | OUTPATIENT
Start: 2023-11-10 | End: 2023-11-11

## 2023-11-10 RX ORDER — AMPICILLIN AND SULBACTAM 2; 1 G/1; G/1
3 INJECTION, POWDER, FOR SOLUTION INTRAMUSCULAR; INTRAVENOUS EVERY 6 HOURS
Status: DISCONTINUED | OUTPATIENT
Start: 2023-11-11 | End: 2023-11-11 | Stop reason: HOSPADM

## 2023-11-10 RX ORDER — DOXYCYCLINE 100 MG/10ML
100 INJECTION, POWDER, LYOPHILIZED, FOR SOLUTION INTRAVENOUS EVERY 12 HOURS
Status: DISCONTINUED | OUTPATIENT
Start: 2023-11-10 | End: 2023-11-11 | Stop reason: HOSPADM

## 2023-11-10 RX ORDER — CLINDAMYCIN PHOSPHATE 900 MG/50ML
900 INJECTION, SOLUTION INTRAVENOUS EVERY 8 HOURS
Status: DISCONTINUED | OUTPATIENT
Start: 2023-11-10 | End: 2023-11-10

## 2023-11-10 RX ORDER — POTASSIUM CHLORIDE 1.5 G/1.58G
40 POWDER, FOR SOLUTION ORAL ONCE
Status: COMPLETED | OUTPATIENT
Start: 2023-11-10 | End: 2023-11-10

## 2023-11-10 RX ORDER — DEXTROSE MONOHYDRATE 25 G/50ML
50 INJECTION, SOLUTION INTRAVENOUS ONCE
Status: COMPLETED | OUTPATIENT
Start: 2023-11-10 | End: 2023-11-10

## 2023-11-10 RX ORDER — MAGNESIUM SULFATE HEPTAHYDRATE 40 MG/ML
2 INJECTION, SOLUTION INTRAVENOUS ONCE
Status: COMPLETED | OUTPATIENT
Start: 2023-11-10 | End: 2023-11-10

## 2023-11-10 RX ORDER — DEXTROSE MONOHYDRATE, SODIUM CHLORIDE, AND POTASSIUM CHLORIDE 50; 1.49; 9 G/1000ML; G/1000ML; G/1000ML
INJECTION, SOLUTION INTRAVENOUS CONTINUOUS
Status: DISCONTINUED | OUTPATIENT
Start: 2023-11-10 | End: 2023-11-10

## 2023-11-10 RX ADMIN — POTASSIUM CHLORIDE 10 MEQ: 7.46 INJECTION, SOLUTION INTRAVENOUS at 20:40

## 2023-11-10 RX ADMIN — POTASSIUM CHLORIDE, DEXTROSE MONOHYDRATE AND SODIUM CHLORIDE: 150; 5; 900 INJECTION, SOLUTION INTRAVENOUS at 21:55

## 2023-11-10 RX ADMIN — DEXTROSE MONOHYDRATE 50 ML: 25 INJECTION, SOLUTION INTRAVENOUS at 21:28

## 2023-11-10 RX ADMIN — DEXTROSE MONOHYDRATE 50 ML: 25 INJECTION, SOLUTION INTRAVENOUS at 19:45

## 2023-11-10 RX ADMIN — MAGNESIUM SULFATE HEPTAHYDRATE 2 G: 40 INJECTION, SOLUTION INTRAVENOUS at 19:57

## 2023-11-10 RX ADMIN — AMPICILLIN SODIUM AND SULBACTAM SODIUM 3 G: 2; 1 INJECTION, POWDER, FOR SOLUTION INTRAMUSCULAR; INTRAVENOUS at 20:17

## 2023-11-10 RX ADMIN — POTASSIUM CHLORIDE 40 MEQ: 1.5 POWDER, FOR SOLUTION ORAL at 20:08

## 2023-11-10 ASSESSMENT — ACTIVITIES OF DAILY LIVING (ADL)
ADLS_ACUITY_SCORE: 35
ADLS_ACUITY_SCORE: 35

## 2023-11-11 VITALS
BODY MASS INDEX: 46.61 KG/M2 | TEMPERATURE: 98.8 F | SYSTOLIC BLOOD PRESSURE: 130 MMHG | HEART RATE: 88 BPM | RESPIRATION RATE: 18 BRPM | WEIGHT: 290 LBS | DIASTOLIC BLOOD PRESSURE: 56 MMHG | HEIGHT: 66 IN | OXYGEN SATURATION: 94 %

## 2023-11-11 LAB
ALBUMIN SERPL BCG-MCNC: 3.4 G/DL (ref 3.5–5.2)
ALP SERPL-CCNC: 227 U/L (ref 35–104)
ALT SERPL W P-5'-P-CCNC: 97 U/L (ref 0–50)
ANION GAP SERPL CALCULATED.3IONS-SCNC: 11 MMOL/L (ref 7–15)
AST SERPL W P-5'-P-CCNC: 68 U/L (ref 0–45)
BILIRUB SERPL-MCNC: 0.4 MG/DL
BUN SERPL-MCNC: 15.2 MG/DL (ref 6–20)
CALCIUM SERPL-MCNC: 10.2 MG/DL (ref 8.6–10)
CHLORIDE SERPL-SCNC: 101 MMOL/L (ref 98–107)
CREAT SERPL-MCNC: 0.66 MG/DL (ref 0.51–0.95)
DEPRECATED HCO3 PLAS-SCNC: 25 MMOL/L (ref 22–29)
EGFRCR SERPLBLD CKD-EPI 2021: >90 ML/MIN/1.73M2
ERYTHROCYTE [DISTWIDTH] IN BLOOD BY AUTOMATED COUNT: 13 % (ref 10–15)
GLUCOSE BLDC GLUCOMTR-MCNC: 182 MG/DL (ref 70–99)
GLUCOSE BLDC GLUCOMTR-MCNC: 203 MG/DL (ref 70–99)
GLUCOSE BLDC GLUCOMTR-MCNC: 261 MG/DL (ref 70–99)
GLUCOSE BLDC GLUCOMTR-MCNC: 265 MG/DL (ref 70–99)
GLUCOSE BLDC GLUCOMTR-MCNC: 296 MG/DL (ref 70–99)
GLUCOSE BLDC GLUCOMTR-MCNC: 336 MG/DL (ref 70–99)
GLUCOSE BLDC GLUCOMTR-MCNC: 345 MG/DL (ref 70–99)
GLUCOSE BLDC GLUCOMTR-MCNC: 384 MG/DL (ref 70–99)
GLUCOSE BLDC GLUCOMTR-MCNC: 385 MG/DL (ref 70–99)
GLUCOSE SERPL-MCNC: 217 MG/DL (ref 70–99)
HBA1C MFR BLD: 7.1 %
HCT VFR BLD AUTO: 38.5 % (ref 35–47)
HGB BLD-MCNC: 12.4 G/DL (ref 11.7–15.7)
MCH RBC QN AUTO: 31.4 PG (ref 26.5–33)
MCHC RBC AUTO-ENTMCNC: 32.2 G/DL (ref 31.5–36.5)
MCV RBC AUTO: 98 FL (ref 78–100)
PLATELET # BLD AUTO: 307 10E3/UL (ref 150–450)
POTASSIUM SERPL-SCNC: 4 MMOL/L (ref 3.4–5.3)
PROT SERPL-MCNC: 6.1 G/DL (ref 6.4–8.3)
RBC # BLD AUTO: 3.95 10E6/UL (ref 3.8–5.2)
SODIUM SERPL-SCNC: 137 MMOL/L (ref 135–145)
WBC # BLD AUTO: 10.2 10E3/UL (ref 4–11)

## 2023-11-11 PROCEDURE — 36415 COLL VENOUS BLD VENIPUNCTURE: CPT | Performed by: STUDENT IN AN ORGANIZED HEALTH CARE EDUCATION/TRAINING PROGRAM

## 2023-11-11 PROCEDURE — 96375 TX/PRO/DX INJ NEW DRUG ADDON: CPT

## 2023-11-11 PROCEDURE — 82962 GLUCOSE BLOOD TEST: CPT

## 2023-11-11 PROCEDURE — 85027 COMPLETE CBC AUTOMATED: CPT | Performed by: STUDENT IN AN ORGANIZED HEALTH CARE EDUCATION/TRAINING PROGRAM

## 2023-11-11 PROCEDURE — 250N000012 HC RX MED GY IP 250 OP 636 PS 637: Performed by: INTERNAL MEDICINE

## 2023-11-11 PROCEDURE — 258N000003 HC RX IP 258 OP 636: Performed by: STUDENT IN AN ORGANIZED HEALTH CARE EDUCATION/TRAINING PROGRAM

## 2023-11-11 PROCEDURE — 96361 HYDRATE IV INFUSION ADD-ON: CPT

## 2023-11-11 PROCEDURE — 250N000012 HC RX MED GY IP 250 OP 636 PS 637: Performed by: STUDENT IN AN ORGANIZED HEALTH CARE EDUCATION/TRAINING PROGRAM

## 2023-11-11 PROCEDURE — 96376 TX/PRO/DX INJ SAME DRUG ADON: CPT

## 2023-11-11 PROCEDURE — 99239 HOSP IP/OBS DSCHRG MGMT >30: CPT | Performed by: INTERNAL MEDICINE

## 2023-11-11 PROCEDURE — G0378 HOSPITAL OBSERVATION PER HR: HCPCS

## 2023-11-11 PROCEDURE — 250N000013 HC RX MED GY IP 250 OP 250 PS 637: Performed by: STUDENT IN AN ORGANIZED HEALTH CARE EDUCATION/TRAINING PROGRAM

## 2023-11-11 PROCEDURE — 250N000011 HC RX IP 250 OP 636: Performed by: STUDENT IN AN ORGANIZED HEALTH CARE EDUCATION/TRAINING PROGRAM

## 2023-11-11 PROCEDURE — 80053 COMPREHEN METABOLIC PANEL: CPT | Performed by: STUDENT IN AN ORGANIZED HEALTH CARE EDUCATION/TRAINING PROGRAM

## 2023-11-11 RX ORDER — LABETALOL HYDROCHLORIDE 5 MG/ML
10 INJECTION, SOLUTION INTRAVENOUS EVERY 4 HOURS PRN
Status: DISCONTINUED | OUTPATIENT
Start: 2023-11-11 | End: 2023-11-11 | Stop reason: HOSPADM

## 2023-11-11 RX ORDER — SODIUM CHLORIDE 9 MG/ML
INJECTION, SOLUTION INTRAVENOUS CONTINUOUS
Status: DISCONTINUED | OUTPATIENT
Start: 2023-11-11 | End: 2023-11-11

## 2023-11-11 RX ORDER — HYDROCHLOROTHIAZIDE 50 MG/1
50 TABLET ORAL DAILY
COMMUNITY
Start: 2023-11-11

## 2023-11-11 RX ORDER — ONDANSETRON 2 MG/ML
4 INJECTION INTRAMUSCULAR; INTRAVENOUS EVERY 6 HOURS PRN
Status: DISCONTINUED | OUTPATIENT
Start: 2023-11-11 | End: 2023-11-11 | Stop reason: HOSPADM

## 2023-11-11 RX ORDER — AMOXICILLIN 250 MG
2 CAPSULE ORAL 2 TIMES DAILY PRN
Status: DISCONTINUED | OUTPATIENT
Start: 2023-11-11 | End: 2023-11-11 | Stop reason: HOSPADM

## 2023-11-11 RX ORDER — POTASSIUM CHLORIDE 1500 MG/1
20 TABLET, EXTENDED RELEASE ORAL DAILY
Qty: 30 TABLET | Refills: 0 | Status: SHIPPED | OUTPATIENT
Start: 2023-11-11

## 2023-11-11 RX ORDER — ACETAMINOPHEN 325 MG/1
975 TABLET ORAL EVERY 8 HOURS
Status: DISCONTINUED | OUTPATIENT
Start: 2023-11-11 | End: 2023-11-11 | Stop reason: HOSPADM

## 2023-11-11 RX ORDER — FUROSEMIDE 20 MG
20 TABLET ORAL DAILY
Status: DISCONTINUED | OUTPATIENT
Start: 2023-11-11 | End: 2023-11-11 | Stop reason: HOSPADM

## 2023-11-11 RX ORDER — NALOXONE HYDROCHLORIDE 0.4 MG/ML
0.2 INJECTION, SOLUTION INTRAMUSCULAR; INTRAVENOUS; SUBCUTANEOUS
Status: DISCONTINUED | OUTPATIENT
Start: 2023-11-11 | End: 2023-11-11 | Stop reason: HOSPADM

## 2023-11-11 RX ORDER — NALOXONE HYDROCHLORIDE 0.4 MG/ML
0.4 INJECTION, SOLUTION INTRAMUSCULAR; INTRAVENOUS; SUBCUTANEOUS
Status: DISCONTINUED | OUTPATIENT
Start: 2023-11-11 | End: 2023-11-11 | Stop reason: HOSPADM

## 2023-11-11 RX ORDER — DOXYCYCLINE 100 MG/1
100 CAPSULE ORAL 2 TIMES DAILY
Qty: 20 CAPSULE | Refills: 0 | Status: SHIPPED | OUTPATIENT
Start: 2023-11-11 | End: 2023-11-21

## 2023-11-11 RX ORDER — NICOTINE POLACRILEX 4 MG
15-30 LOZENGE BUCCAL
Status: DISCONTINUED | OUTPATIENT
Start: 2023-11-11 | End: 2023-11-11 | Stop reason: HOSPADM

## 2023-11-11 RX ORDER — DEXTROSE MONOHYDRATE 25 G/50ML
25-50 INJECTION, SOLUTION INTRAVENOUS
Status: DISCONTINUED | OUTPATIENT
Start: 2023-11-11 | End: 2023-11-11 | Stop reason: HOSPADM

## 2023-11-11 RX ORDER — OXYCODONE HYDROCHLORIDE 5 MG/1
5 TABLET ORAL EVERY 4 HOURS PRN
Status: DISCONTINUED | OUTPATIENT
Start: 2023-11-11 | End: 2023-11-11 | Stop reason: HOSPADM

## 2023-11-11 RX ORDER — LISINOPRIL 2.5 MG/1
2.5 TABLET ORAL DAILY
Status: DISCONTINUED | OUTPATIENT
Start: 2023-11-11 | End: 2023-11-11 | Stop reason: HOSPADM

## 2023-11-11 RX ORDER — CITALOPRAM HYDROBROMIDE 20 MG/1
40 TABLET ORAL DAILY
Status: DISCONTINUED | OUTPATIENT
Start: 2023-11-11 | End: 2023-11-11 | Stop reason: HOSPADM

## 2023-11-11 RX ORDER — AMOXICILLIN 250 MG
1 CAPSULE ORAL 2 TIMES DAILY PRN
Status: DISCONTINUED | OUTPATIENT
Start: 2023-11-11 | End: 2023-11-11 | Stop reason: HOSPADM

## 2023-11-11 RX ORDER — ATORVASTATIN CALCIUM 40 MG/1
40 TABLET, FILM COATED ORAL DAILY
Status: DISCONTINUED | OUTPATIENT
Start: 2023-11-11 | End: 2023-11-11 | Stop reason: HOSPADM

## 2023-11-11 RX ORDER — ONDANSETRON 4 MG/1
4 TABLET, ORALLY DISINTEGRATING ORAL EVERY 6 HOURS PRN
Status: DISCONTINUED | OUTPATIENT
Start: 2023-11-11 | End: 2023-11-11 | Stop reason: HOSPADM

## 2023-11-11 RX ADMIN — DOXYCYCLINE 100 MG: 100 INJECTION, POWDER, LYOPHILIZED, FOR SOLUTION INTRAVENOUS at 00:10

## 2023-11-11 RX ADMIN — AMPICILLIN SODIUM AND SULBACTAM SODIUM 3 G: 2; 1 INJECTION, POWDER, FOR SOLUTION INTRAMUSCULAR; INTRAVENOUS at 02:07

## 2023-11-11 RX ADMIN — ACETAMINOPHEN 975 MG: 325 TABLET, FILM COATED ORAL at 02:05

## 2023-11-11 RX ADMIN — CITALOPRAM HYDROBROMIDE 40 MG: 20 TABLET ORAL at 10:06

## 2023-11-11 RX ADMIN — AMPICILLIN SODIUM AND SULBACTAM SODIUM 3 G: 2; 1 INJECTION, POWDER, FOR SOLUTION INTRAMUSCULAR; INTRAVENOUS at 14:46

## 2023-11-11 RX ADMIN — FUROSEMIDE 20 MG: 20 TABLET ORAL at 02:06

## 2023-11-11 RX ADMIN — ATORVASTATIN CALCIUM 40 MG: 40 TABLET, FILM COATED ORAL at 02:06

## 2023-11-11 RX ADMIN — LISINOPRIL 2.5 MG: 2.5 TABLET ORAL at 02:05

## 2023-11-11 RX ADMIN — INSULIN HUMAN 15 UNITS: 100 INJECTION, SUSPENSION SUBCUTANEOUS at 13:03

## 2023-11-11 RX ADMIN — DOXYCYCLINE 100 MG: 100 INJECTION, POWDER, LYOPHILIZED, FOR SOLUTION INTRAVENOUS at 12:39

## 2023-11-11 RX ADMIN — SODIUM CHLORIDE: 9 INJECTION, SOLUTION INTRAVENOUS at 02:07

## 2023-11-11 RX ADMIN — ACETAMINOPHEN 975 MG: 325 TABLET, FILM COATED ORAL at 10:06

## 2023-11-11 RX ADMIN — INSULIN GLARGINE 5 UNITS: 100 INJECTION, SOLUTION SUBCUTANEOUS at 03:22

## 2023-11-11 RX ADMIN — INSULIN ASPART 4 UNITS: 100 INJECTION, SOLUTION INTRAVENOUS; SUBCUTANEOUS at 02:03

## 2023-11-11 RX ADMIN — AMPICILLIN SODIUM AND SULBACTAM SODIUM 3 G: 2; 1 INJECTION, POWDER, FOR SOLUTION INTRAMUSCULAR; INTRAVENOUS at 10:04

## 2023-11-11 RX ADMIN — ACETAMINOPHEN 975 MG: 325 TABLET, FILM COATED ORAL at 16:42

## 2023-11-11 ASSESSMENT — ACTIVITIES OF DAILY LIVING (ADL)
ADLS_ACUITY_SCORE: 31

## 2023-11-11 NOTE — ED NOTES
Informed hospitalist to order all her home medications -antihyperensive meds,citalopram and statin.

## 2023-11-11 NOTE — PROGRESS NOTES
Observation goals  PRIOR TO DISCHARGE       Comments: -diagnostic tests and consults completed and resulted: not met  -vital signs normal or at patient baseline: met  -safe disposition plan has been identified: met  Nurse to notify provider when observation goals have been met and patient is ready for discharge.

## 2023-11-11 NOTE — ED TRIAGE NOTES
Last night noted pain over left breast and found redness with drainage coming out. Painful to touch.      Triage Assessment (Adult)       Row Name 11/10/23 7437          Triage Assessment    Airway WDL WDL        Respiratory WDL    Respiratory WDL WDL        Skin Circulation/Temperature WDL    Skin Circulation/Temperature WDL X        Cardiac WDL    Cardiac WDL X        Peripheral/Neurovascular WDL    Peripheral Neurovascular WDL WDL        Cognitive/Neuro/Behavioral WDL    Cognitive/Neuro/Behavioral WDL WDL

## 2023-11-11 NOTE — ED PROVIDER NOTES
"  History     Chief Complaint:  Wound Check       The history is provided by the patient.      Genesis Carnes is a 53 year old female with history of diabetes mellitus, hypertension, and hyperlipidemia who presents with hypoglycemia. Patient was seen in the [facility] \"spacing out\" and ripped out her IV. Patient initially came in with left breast pain, redness and discharge since last night.       Independent Historian:   None - Patient Only    Review of External Notes:   I reviewed patient previous ED note from September of this year. I reviewed the nurse triage note from today.    Medications:    Lipitor   Celexa  Lasix  Insulin  Zestril  Mycostatin     Past Medical History:    Anxiety   Depression   Hypertension   Diabetes mellitus   Hyperlipidemia  Hyperparathyroidism   Morbid obesity   Multinodular goiter  Vitamin D deficiency  Goiter diffuse   Constipation   Pneumonia        Past Surgical History:      Endometrial ablation  Brachiocephalic trunk     Physical Exam   Patient Vitals for the past 24 hrs:   BP Temp Temp src Pulse Resp SpO2 Height Weight   11/10/23 2135 -- -- -- 90 20 99 % -- --   11/10/23 2134 (!) 154/76 -- -- 92 20 -- -- --   11/10/23 2045 134/68 -- -- 92 11 99 % -- --   11/10/23 2025 126/58 -- -- 96 30 -- -- --   11/10/23 2010 137/76 -- -- 91 25 96 % -- --   11/10/23 1955 128/60 -- -- 98 12 95 % -- --   11/10/23 1946 -- -- -- 100 14 96 % -- --   11/10/23 1944 126/60 -- -- 101 -- 96 % -- --   11/10/23 1839 (!) 182/75 97.6  F (36.4  C) Temporal 107 18 97 % 1.676 m (5' 6\") 131.5 kg (290 lb)        Physical Exam  General: Acutely confused   Head:  Scalp is atraumatic  Eyes:  The pupils are equal, round, and reactive to light    EOM's intact    No scleral icterus  ENT:      Nose:  The external nose is normal  Ears:  External ears are normal  Mouth/Throat: The oropharynx is normal    Mucus membranes are moist       Neck:  Normal range of motion.      There is no rigidity.    Trachea is in the " midline         CV:  Regular rate and rhythm    No murmur   Resp:  Breath sounds are clear bilaterally    Non-labored, no retractions or accessory muscle use      GI:  Abdomen is soft, no distension, no tenderness.       MS:  Normal strength in all 4 extremities, No midline cervical, thoracic, or lumbar tenderness  Skin:  Diaphoretic. Abscess surrounding erythema on left breast.  See below.    Neuro:  Initially confused and not following commands, after D50 administered patient had a normal neurologic exam          Emergency Department Course       Imaging:  US Breast Left Limited 1-3 Quadrants    (Results Pending)   Verbal report from radiologist relays an abnormal area that they considered tumor versus abscess, formal read by breast radiology at a later date      Laboratory:  Labs Ordered and Resulted from Time of ED Arrival to Time of ED Departure   BASIC METABOLIC PANEL - Abnormal       Result Value    Sodium 139      Potassium 3.0 (*)     Chloride 99      Carbon Dioxide (CO2) 29      Anion Gap 11      Urea Nitrogen 16.6      Creatinine 0.68      GFR Estimate >90      Calcium 11.4 (*)     Glucose 26 (*)    CBC WITH PLATELETS AND DIFFERENTIAL - Abnormal    WBC Count 15.7 (*)     RBC Count 4.38      Hemoglobin 13.8      Hematocrit 41.1      MCV 94      MCH 31.5      MCHC 33.6      RDW 13.0      Platelet Count 377      % Neutrophils        % Lymphocytes        % Monocytes        % Eosinophils        % Basophils        % Immature Granulocytes        NRBCs per 100 WBC 0      Absolute Neutrophils        Absolute Lymphocytes        Absolute Monocytes        Absolute Eosinophils        Absolute Basophils        Absolute Immature Granulocytes        Absolute NRBCs 0.0     DIFFERENTIAL - Abnormal    % Neutrophils 49      % Lymphocytes 42      % Monocytes 7      % Eosinophils 1      % Basophils 1      Absolute Neutrophils 7.7      Absolute Lymphocytes 6.6 (*)     Absolute Monocytes 1.1      Absolute Eosinophils 0.2       Absolute Basophils 0.2      RBC Morphology Confirmed RBC Indices      Platelet Assessment        Value: Automated Count Confirmed. Platelet morphology is normal.   GLUCOSE BY METER - Abnormal    GLUCOSE BY METER POCT 30 (*)    GLUCOSE BY METER - Abnormal    GLUCOSE BY METER POCT 125 (*)    GLUCOSE BY METER - Abnormal    GLUCOSE BY METER POCT 133 (*)    LACTIC ACID WHOLE BLOOD - Normal    Lactic Acid 0.9     GLUCOSE BY METER - Normal    GLUCOSE BY METER POCT 84     GLUCOSE BY METER - Normal    GLUCOSE BY METER POCT 75     GLUCOSE MONITOR NURSING POCT   BLOOD CULTURE   BLOOD CULTURE          Emergency Department Course & Assessments:       Interventions:  Medications   dextrose 5% and 0.9% NaCl + KCL 20 mEq/L infusion ( Intravenous $New Bag 11/10/23 2155)   dextrose 50 % injection 50 mL (50 mLs Intravenous $Given 11/10/23 1945)   potassium chloride (KLOR-CON) Packet 40 mEq (40 mEq Oral $Given 11/10/23 2008)   potassium chloride 10 mEq in 100 mL sterile water infusion (10 mEq Intravenous $New Bag 11/10/23 2040)   magnesium sulfate 2 g in 50 mL sterile water intermittent infusion (0 g Intravenous Stopped 11/10/23 2038)   ampicillin-sulbactam (UNASYN) 3 g vial to attach to  mL bag (0 g Intravenous Stopped 11/10/23 2054)   dextrose 50 % injection 50 mL (50 mLs Intravenous $Given 11/10/23 2128)        Assessments:  1937 I obtained history and examined the patient as noted above.     Independent Interpretation (X-rays, CTs, rhythm strip):  None    Consultations/Discussion of Management or Tests:  Consulting with clincal pharmacist about antibiotic selection.    2300 I spoke with Dr. Henriquez  from the hospitalist service regarding the patient's presentation, findings here in the ED, and plan of care.     Social Determinants of Health affecting care:   None    Disposition:  The patient was admitted to the hospital under the care of Dr. Henriquez.     Impression & Plan        Medical Decision Making:  Follow-up  presentation history and physical examination were initially obtained from my partner in the lobby patient was noted to be altered in the lobby and brought back with a quite low blood sugar, after administration of D50 her mentation normalized.  Findings are consistent with an abscess to the left breast, given these findings and the recurrent nature as she has had a similar abscess on the right breast she will be admitted for IV antibiotics and possible surgical consultation.  She did have a recurrence of her hypoglycemia which is managed with medications as noted above, she was subsequently able to tolerate p.o. intake and remained normoglycemic.  I discussed case with hospitalist service and they were in agreement with admission.  There is no signs of a necrotizing skin infection, sepsis syndrome, or more concerning illness.      Diagnosis:    ICD-10-CM    1. Abscess of left breast  N61.1       2. Cellulitis of female breast  N61.0       3. Hypoglycemia  E16.2                Scribe Disclosure:  SONIYA Isai Isaitl Goss, am serving as a scribe at 7:45 PM on 11/10/2023 to document services personally performed by Kameron Hughes MD based on my observations and the provider's statements to me.   11/10/2023   Kameron Hughes MD Trigger, Brandon Thomas, MD  11/10/23 6126

## 2023-11-11 NOTE — SIGNIFICANT EVENT
Significant Event Note    Time of event: 3:51 AM November 11, 2023    Description of event:  Increased Finger sticks    Plan:  7u ordered aspart, along with long acting 5u ordered  Patient repeat FS 350s, patient states that she was going to self adminster 25u despite her recent severe hypoglycemia episode as she belives treatment is inadequate  Will give additional 10u    Patient has capacity to make informed decisions, and I do not believe her actions are a purposefl way of hurting self. However, risks of bodily harm 2/2 hyperglycemia are lesser than the risks of hypoglycemia. Will continue to repeat FS and apply Hospital related Insulin to ensure an adequate level    Matthew Henriquez MD

## 2023-11-11 NOTE — PROVIDER NOTIFICATION
"MD Notification    Notified Person: MD    Notified Person Name: Heraclio Baker    Notification Date/Time: 11/11/23  1:16 AM     Notification Interaction: Vocera text    Purpose of Notification: \"517 RJ: FYI: pt brought home insulin pen. She said if she feels her blood sugar is too high for too long, she \"is going to try not to be that patient that treats themselves\" but she \"might have to.\" Refused to give pen to nurse. nurse provided education.\"    Orders Received: Dr. Baker instructed to page Dr. Henriquez. Dr. Henriquez acknowledged and instructed to document interaction.    Comments: sent same page to Matthew Henriquez MD at 1:18 AM      "

## 2023-11-11 NOTE — PLAN OF CARE
Discharge Note    Patient discharged to home via private vehicle  accompanied by no family/friend .  IV: Discontinued  Prescriptions filled and given to patient/family.   Belongings reviewed and sent with patient.   Home medications returned to patient: NA  Equipment sent with: patient.   patient verbalizes understanding of discharge instructions. AVS given to patient.

## 2023-11-11 NOTE — PHARMACY-ADMISSION MEDICATION HISTORY
Pharmacy Intern Admission Medication History    Admission medication history is complete. The information provided in this note is only as accurate as the sources available at the time of the update.    Information Source(s): Patient via in-person    Pertinent Information: Patient wasn't able to provide the amount of units that she uses on a day to day basis, stating that it depended on her blood sugar values. Patient is currently using NPH in the morning and at night and Humalog insulin throughout the day.  Per chart review 9/7/23 office visit states that the patient uses NPH 20-25 units twice daily. Prior to that office visit, patient was using regular insulin three times daily 12-25 units depending on meal size.  Patient currently uses humalog insulin, but was not able to state the amount of units she is currently using and dose is not in the office visit notes.    Patient has the supplies for an insulin pump, but is not currently using.  She reports that she was going to start using the pump on Sunday.    Patient reports taking furosemide daily.  Patient reports taking an additional hydrochlorothiazide when she has additional leg swelling.    Changes made to PTA medication list:  Added: NPH insulin, Humalog U-200 per 9/7/23 office visit prescription  Deleted: None  Changed: Lisinopril dose    Allergies reviewed with patient and updates made in EHR: yes    Medication History Completed By: Benny Mcclain 11/11/2023 10:21 AM    Prior to Admission medications    Medication Sig Last Dose Taking? Auth Provider Long Term End Date   atorvastatin (LIPITOR) 20 MG tablet Take 40 mg by mouth daily 11/9/2023 at pm Yes Unknown, Entered By History No    citalopram (CELEXA) 40 MG tablet Take 40 mg by mouth daily 11/10/2023 at am Yes Unknown, Entered By History No    furosemide (LASIX) 20 MG tablet Take 20 mg by mouth daily 11/9/2023 Yes Reported, Patient Yes    hydrochlorothiazide (HYDRODIURIL) 50 MG tablet Take 1 tablet (50  mg) by mouth daily (If you take both lasix and hydrochlorothiazide, take additional tablet of potassium that day to equal 2 tablets).  at prn Yes Janet Cheema MD Yes    Insulin Lispro (HUMALOG KWIK PEN) 200 UNIT/ML soln Inject Subcutaneous 3 times daily (before meals) 11/10/2023 Yes Unknown, Entered By History No    insulin  UNIT/ML vial Inject Subcutaneous 2 times daily 11/10/2023 Yes Unknown, Entered By History     lisinopril (ZESTRIL) 2.5 MG tablet Take 5 mg by mouth daily 11/9/2023 at pm Yes Unknown, Entered By History No    vitamin D3 (CHOLECALCIFEROL) 50 mcg (2000 units) tablet Take 1 tablet by mouth daily 11/9/2023 Yes Unknown, Entered By History

## 2023-11-11 NOTE — PROVIDER NOTIFICATION
"MD Notification    Notified Person: MD    Notified Person Name: Matthew Henriquez     Notification Date/Time: 11/11/23  2:55 AM    Notification Interaction: vocera text    Purpose of Notification: \"pt BG was 385, gave HS novolog of 4 units, BG now 384 after 30 minutes. Any additional orders? Thanks\"     Orders Received: 7 units novolog and 5 units lantus ordered    Comments:    "

## 2023-11-11 NOTE — ED NOTES
[facility]  ED Nurse Handoff Report    ED Chief complaint: Wound Check      ED Diagnosis:   Final diagnoses:   Abscess of left breast   Cellulitis of female breast   Hypoglycemia       Code Status: to be addressed    Allergies:   Allergies   Allergen Reactions    Pravastatin      Other reaction(s): Myalgia    Soybean Oil      Other reaction(s): GI Upset       Patient Story: came today because of low blood sugar  and left breast pain and abscess left breast,multiple co morbidities as documented.  Focused Assessment:  not distress,mild elevated BP,alert,with signs of infection around the left breast(redness,mild bloody discharge)    Treatments and/or interventions provided: iv access x2 18 g,labs,potassium replacement.IVF with potassium,dextrose 50%,antibiotic,magnesium replacement,ultrasound  Patient's response to treatments and/or interventions: tolerated    To be done/followed up on inpatient unit:  as per admission order,monitor glucose    Does this patient have any cognitive concerns?:  none    Activity level - Baseline/Home:  Independent  Activity Level - Current:   Independent    Patient's Preferred language: English   Needed?: No    Isolation: None  Infection: Not Applicable  Patient tested for COVID 19 prior to admission: NO  Bariatric?: Yes    Vital Signs:   Vitals:    11/10/23 2025 11/10/23 2045 11/10/23 2134 11/10/23 2135   BP: 126/58 134/68 (!) 154/76    Pulse: 96 92 92 90   Resp: 30 11 20 20   Temp:       TempSrc:       SpO2:  99%  99%   Weight:       Height:           Cardiac Rhythm:     Was the PSS-3 completed:   Yes  What interventions are required if any?               Family Comments: none  OBS brochure/video discussed/provided to patient/family: Yes              Name of person given brochure if not patient: none              Relationship to patient: none    For the majority of the shift this patient's behavior was Green.   Behavioral interventions performed were  none.    ED NURSE PHONE NUMBER: [phone]

## 2023-11-11 NOTE — SIGNIFICANT EVENT
Significant Event Note    Time of event: 12:45 AM November 11, 2023    Description of event:  Home medications requested to be restarted    Plan:  Home meds restarted per pt's request  Confirm with pharamcy in AM after reconcilliation    Matthew Henriquez MD

## 2023-11-11 NOTE — PROVIDER NOTIFICATION
"MD Notification    Notified Person: MD    Notified Person Name: TAMARA MEJIAS     Notification Date/Time: 11/11/23  12:49 AM    Notification Interaction: vocera text    Purpose of Notification: \"517 RJ: per your note, change fluids to NS when Blood sugar>180. current . Want new fluids ordered? Thanks\"  \"Also, PTA hydrochlorothiazide not ordered. Can you please order this? Thanks.\"    Orders Received: fluids changed to NS. No hydrochlorothiazide orders.    Comments:    "

## 2023-11-11 NOTE — PROGRESS NOTES
[facility]    Hospitalist Progress Note    Date of Service (when I saw the patient): 11/11/2023  Admit date: 11/10/2023    Interval History   Full details of events over last 24 hours outlined below.   Cellulitis significantly improved. No longer draining pus, soft, decreased pain, no longer warm to touch.   Rest of history as below.   Denies any SOB, CP, abdominal pain, N/V/D.    Assessment & Plan    53F hx of morbid obesity, T1D, hyperparathyroidism, anxiety, HTN, HLD presenting with L breast purulent cellulitis, accompanied with an episode of refractory hypoglycemia.      Patient presents with R sided breast pain/drainage that started 1 day ago, associated with erythema that started 4 days ago. Patietn states that the drainage is pus/bloody. Patient denies being on steroids/recent travel/fevers/chills. Last month, patient had similar episode in the R breast, that resolved following oral abx (patient AMAd and had to wait 1 week afterwards before starting her abx regimen). Blood cultures were negative and patient received vancomcyin  * Vitals/Exam: [id number], L inferior breast warm, erythamous, with peau d'orange skin, open ulceration, no active bleeding/pus but dried blood is present  * Labs: K 3, Ca 11.4, Glucose 26, WBC 15.7, lactate negative  * Imaging: prelim US result> Findings are suggestive of left breast cellulitis. A discrete drainable pocket of fluid is not visualized. A neoplastic etiology is not excluded   * ER Course: Unasyn     L purulent breast cellulitis,   H/o R breast cellulitis   Suspect non-lactational Mastitis given purulent discharge, no abscess or fluid collection on U/S   Leukocytosis, RESOLVED   *Received 4 doses of Unasyn during her short hospital course + doxycycline to cover MRSA given purulent component and recent abx exposure (no culture obtained when purulent drainage present, and no longer draining at time of discharge.   * Ultrasound on 11/2: Findings are  "suggestive of left breast cellulitis. A discrete drainable pocket of fluid is not visualized. A neoplastic etiology is not excluded. Of note, there was a similar finding of the contralateral right breast on 09/16/2023. Clinical  correlation and further evaluation in the Breast Center is recommended.\"     Erythema receding, breast soft without induration, no purulent discharge, minimal tenderness.   Continue Unasyn until 1400 today, then discharge on 10 day course of Augmentin + Doxycycline  Follow up with PCP in one week. Consider abbreviated course of 7 days if quick resolution of symptoms.   BCx NGTD  Outpatient follow up with general surgery. Discussed with Dr. Lisa Zepeda     Hypoglycemia, concern for hypoglycemic unawareness   Acute metabolic encephelopathy  Diabetes mellitus, type 1, A1c 7.1 on 11/10  * Patient works with endocrinologist and has been using NPH and novolog at home but due to labile sugars with some los, she is being transitioned to a pump by her endocrinologist. She plans to start this tomorrow.   * In the ER waiting area, patient was confused, and reports that attempted to remove IV. Found to be hypoglcemia, and was started on D5 fluid after persistant hypoglycemia despite a D50 amp being given. Patient stated, that once ever she is under stress, be it mentally or physiologically such as during a infection, she becomes hypoglyemic. States she had a total of 36u of insulin by the time of lunch, did not have dinner.  * Of note, patient states she does not remember the ER episode, and states lately her body has sometimes failed to react to hypoglycemic episodes suggesting that the patient is developing hypoglycemia unawareness. With normalization of blood sugar in ED, encephalopathy completely resolved. She was Aox4, no focal deficits, back at baseline per daughter.  * Hypoglycemic episode likely due to patient missing dinner, and not due to over use of insulin    She received 21 units " novolog with night time meal and 5 units of lantus at HS.   BS this AM is 336 >   Continue 1 unit / 10 g carb with meals. Allow patient to adjust meal time novolog per her routine and to remain in hospital for at lest 4 hours after novolog dose to ensure no lows.   Continue medium correction dose insulin  Give 15 units NPH (Patient normally takes 15-20 units NPH at home in AM, 5 units in PM and received 5 units of lantus overnight.   Patient is starting Continuous Glucose Monitoring with Pump tomorrow AM as prescribed by endocrinologist.         Recent Labs   Lab 11/11/23  0948 11/11/23  0707 11/11/23  0623 11/11/23  0458 11/11/23  0335 11/11/23  0248   * 217* 182* 265* 345* 384*         Depression and anxiety  - Continue home citalopram     Hypertension  Chronic lower extremity edema  - Continue home lisinopril , lasix  --PRN labetalol     Hyperlipidemia  - Resumed home lipitor     Chronic Hypokalemia  * On lasix + PRN hydrochlorothiazide dose at home. History of recurring low K levels in ambulatory setting.     Resolved with replacement here.   Discharge on 20 meq K with additional dose on days she takes hydrochlorothiazide  Consider spironolactone in the future, but I am not going to start while having above breast issues. Defer to PCP     Hyperparathyroidism   Hypercalcemia  Multinodular goiter  Vitamin D deficiency  * follows with endocrine  * Calcium 11.4 which is similar to baseline at admission > 10.4  S/p IVF  resume vit D repletion on discharge    Liver enzyme elevation, without fever or RUQ pain, nl bilirubin to suggest hepaitits or gallbladder disease.   This may be secondary to fatty liver disease or acute illness. No excessive tylenol or recent new drugs.   ALT < 3x normal so continue statin, unlikely culprit  Follow up with liver panel with PCP.   Discussed weight loss.     Recent Labs   Lab 11/11/23  0707 11/10/23  1846   ALBUMIN 3.4*  --    BILITOTAL 0.4  --    ALT 97*  --    AST 68*  --   "  ALKPHOS 227*  --    CR 0.66 0.68       Obesity  Referral placed for medical mgt weight loss program. She does not want to consider bariatric surgery.     Clinically Significant Risk Factors Present on Admission        # Hypokalemia: Lowest K = 3 mmol/L in last 2 days, will replace as needed    # Hypercalcemia: Highest Ca = 11.4 mg/dL in last 2 days, will monitor as appropriate    # Hypoalbuminemia: Lowest albumin = 3.4 g/dL at 11/11/2023  7:07 AM, will monitor as appropriate     # Hypertension: Home medication list includes antihypertensive(s)      # Severe Obesity: Estimated body mass index is 46.81 kg/m  as calculated from the following:    Height as of this encounter: 1.676 m (5' 6\").    Weight as of this encounter: 131.5 kg (290 lb).                PT/OT: None  Diet: Orders Placed This Encounter      Moderate Consistent Carb (60 g CHO per Meal) Diet      Diet     IVF: Stop IVF  DVT Prophylaxis: Discharge to day, up and ambulating  Driscoll Catheter: Not present    Lines: Peripheral  Cardiac Monitoring: None   Full Code  Disposition: Anticipated discharge to TCU pending MA  Communication: Discussed with patient and RN on 11/11/23    Janet Cheema MD    Hospitalist Service  [facility]  Securely message with the Vocera Web Console (learn more here)  Text page via [employer] Paging[employer]ensembli[employer]    Medical Decision Making       Over 70 MINUTES SPENT BY ME on the date of service doing chart review, history, exam, documentation & further activities per the note.          -Data reviewed today: I reviewed all new labs and imaging results over the last 24 hours. I personally reviewed no images or EKG's today.    Physical Exam   Temp: 97.9  F (36.6  C) Temp src: Oral BP: 120/56 Pulse: 99   Resp: 18 SpO2: 95 % O2 Device: None (Room air)    Vitals:    11/10/23 1839   Weight: 131.5 kg (290 lb)     Vital Signs with Ranges  Temp:  [97.6  F (36.4  C)-99  F (37.2  C)] 97.9  F (36.6  C)  Pulse:  [] " 99  Resp:  [11-30] 18  BP: (120-182)/(54-76) 120/56  SpO2:  [93 %-100 %] 95 %  I/O last 3 completed shifts:  In: 150 [IV Piggyback:150]  Out: -     Today's Exam  Constitutional: NAD,   Neuropsyche:  alert and oriented, answers questions appropriately.   Respiratory:  Breathing comfortably, good air exchange, no wheezes, no crackles.   Cardiovascular: Regular rate and rhythm, no edema.  GI:  soft, NT/ND, BS normal  Skin/Integumen: Left breast shows 4 cm area of mild erythema in inferior breast,  no longer able to express pus from small area of ulceration. Not indurated, minimal tenderness.  No acute rash or sign of bleeding.     Medications   All medications reviewed on 11/11/23     sodium chloride 100 mL/hr at 11/11/23 0357      acetaminophen  975 mg Oral Q8H    ampicillin-sulbactam  3 g Intravenous Q6H    atorvastatin  40 mg Oral Daily    citalopram  40 mg Oral Daily    doxycycline  100 mg Intravenous Q12H    furosemide  20 mg Oral Daily    insulin aspart   Subcutaneous TID w/meals    insulin aspart  1-7 Units Subcutaneous TID AC    insulin aspart  1-5 Units Subcutaneous At Bedtime    insulin aspart  5 Units Subcutaneous Once    insulin aspart  25 Units Subcutaneous Once    [START ON 11/12/2023] insulin NPH  20 Units Subcutaneous QAM AC    lisinopril  2.5 mg Oral Daily     PRN Meds: glucose **OR** dextrose **OR** glucagon, labetalol, melatonin, naloxone **OR** naloxone **OR** naloxone **OR** naloxone, ondansetron **OR** ondansetron, oxyCODONE, senna-docusate **OR** senna-docusate    Data   Recent Labs   Lab 11/11/23  0948 11/11/23  0707 11/11/23  0623 11/10/23  1953 11/10/23  1846   WBC  --  10.2  --   --  15.7*   HGB  --  12.4  --   --  13.8   MCV  --  98  --   --  94   PLT  --  307  --   --  377   NA  --  137  --   --  139   POTASSIUM  --  4.0  --   --  3.0*   CHLORIDE  --  101  --   --  99   CO2  --  25  --   --  29   BUN  --  15.2  --   --  16.6   CR  --  0.66  --   --  0.68   ANIONGAP  --  11  --   --  11    VY  --  10.2*  --   --  11.4*   * 217* 182*   < > 26*   ALBUMIN  --  3.4*  --   --   --    PROTTOTAL  --  6.1*  --   --   --    BILITOTAL  --  0.4  --   --   --    ALKPHOS  --  227*  --   --   --    ALT  --  97*  --   --   --    AST  --  68*  --   --   --     < > = values in this interval not displayed.       Recent Results (from the past 24 hour(s))   US Breast Left Limited 1-3 Quadrants    Narrative    PRELIMINARY REPORT    EXAM: ULTRASOUND BREAST UNILATERAL LEFT LIMITED  LOCATION: [facility]  DATE: 11/10/2023    INDICATION: Redness, pain, and drainage of the inferior left breast.    COMPARISON: Mammogram 01/08/2021. Right breast ultrasound 09/16/2023.    ULTRASOUND FINDINGS: Targeted ultrasound evaluation was performed at the site of redness, pain, and drainage of the left breast at the 5 o'clock position. Distance from the nipple was not recorded by the sonographer. At this site, there is a 4.6 x 3.6 x   0.8 cm ovoid area of abnormal hypoechoic texture involving the skin and subcutaneous fat with some peripheral increased color Doppler vascularity. A discrete drainable pocket of fluid is not visualized. Of note, there was a similar finding of the   contralateral right breast on the prior study of 09/16/2023.      Impression    IMPRESSION: Findings are suggestive of left breast cellulitis. A discrete drainable pocket of fluid is not visualized. A neoplastic etiology is not excluded. Of note, there was a similar finding of the contralateral right breast on 09/16/2023. Clinical   correlation and further evaluation in the Breast Center is recommended.    I discussed the findings with Dr. Sosa of the ED at 10:30 PM on 11/10/2023.    ACR BI-RADS Category 3: Probably Benign.    Preliminary Interpretation By: Remy Sharpe MD  Date: 11/10/2023.

## 2023-11-11 NOTE — PROVIDER NOTIFICATION
"MD Notification    Notified Person: MD    Notified Person Name: Matthew Henriquez     Notification Date/Time: 11/11/23  3:45 AM    Notification Interaction: vocera text    Purpose of Notification: \"pt blood sugar now 345. long acting insulin was just given now however. Pt frustrated with taking small doses of insulin and blood sugar not coming down. Pt now saying she would like you to order 25 units novolog or she will take her own insulin. Any chance you can order any additional novolog? Any other ideas? Thanks.\"    Orders Received: 10 units novolog ordered. NS increased to 100mL/hr    Comments:    "

## 2023-11-11 NOTE — PROGRESS NOTES
RECEIVING UNIT ED HANDOFF REVIEW    ED Nurse Handoff Report was reviewed by: Roxana Sam RN on November 11, 2023 at 12:00 AM

## 2023-11-11 NOTE — DISCHARGE SUMMARY
"[facility]  Hospitalist Discharge Summary      Date of Admission:  11/10/2023  Date of Discharge:  11/11/2023  Discharging Provider: Janet Cheema MD  Discharge Service: Hospitalist Service    Discharge Diagnoses   L purulent breast cellulitis,   H/o R breast cellulitis   Suspect non-lactational Mastitis given purulent discharge, no abscess or fluid collection on U/S   Leukocytosis, RESOLVED  Hypoglycemia, concern for hypoglycemic unawareness   Acute metabolic encephelopathy  Diabetes mellitus, type 1, A1c 7.1 on 11/10  Depression and anxiety  Hypertension  Chronic lower extremity edema  Hyperlipidemia  Chronic Hypokalemia  Hyperparathyroidism   Hypercalcemia  Multinodular goiter  Vitamin D deficiency  Liver enzyme elevation, without fever or RUQ pain, nl bilirubin   Obesity, stage III     Clinically Significant Risk Factors     # Severe Obesity: Estimated body mass index is 46.81 kg/m  as calculated from the following:    Height as of this encounter: 1.676 m (5' 6\").    Weight as of this encounter: 131.5 kg (290 lb).       Follow-ups Needed After Discharge   Follow-up Appointments     Follow-up and recommended labs and tests       Please call Surgery clinic to schedule an appt with Dr. Lisa Valenzuela   for recurrent mastitis. [phone]        Follow-up and recommended labs and tests       Follow up with primary care provider, [facility], within 7 days to evaluate medication change and for hospital   follow- up.  The following labs/tests are recommended: complete metabolic   panel.            Unresulted Labs Ordered in the Past 30 Days of this Admission       Date and Time Order Name Status Description    11/10/2023  7:54 PM Blood Culture Peripheral Blood In process     11/10/2023  7:54 PM Blood Culture Line, venous In process         Hospitalist group will be notified if cultures turn positive. No growth to date.       Discharge Disposition   Discharged " "to home  Condition at discharge: Good    Hospital Course    53F hx of morbid obesity, T1D, hyperparathyroidism, anxiety, HTN, HLD presenting with L breast purulent cellulitis, accompanied with an episode of refractory hypoglycemia.      Patient presents with R sided breast pain/drainage that started 1 day ago, associated with erythema that started 4 days ago. Patietn states that the drainage is pus/bloody. Patient denies being on steroids/recent travel/fevers/chills. Last month, patient had similar episode in the R breast, that resolved following oral abx (patient AMAd and had to wait 1 week afterwards before starting her abx regimen). Blood cultures were negative and patient received vancomcyin  * Vitals/Exam: [id number], L inferior breast warm, erythamous, with peau d'orange skin, open ulceration, no active bleeding/pus but dried blood is present  * Labs: K 3, Ca 11.4, Glucose 26, WBC 15.7, lactate negative  * Imaging: prelim US result> Findings are suggestive of left breast cellulitis. A discrete drainable pocket of fluid is not visualized. A neoplastic etiology is not excluded   * ER Course: Unasyn     L purulent breast cellulitis,   H/o R breast cellulitis   Suspect non-lactational Mastitis given purulent discharge, no abscess or fluid collection on U/S   Leukocytosis, RESOLVED   *Received 4 doses of Unasyn during her short hospital course + doxycycline to cover MRSA given purulent component and recent abx exposure (no culture obtained when purulent drainage present, and no longer draining at time of discharge.   * Ultrasound on 11/2: Findings are suggestive of left breast cellulitis. A discrete drainable pocket of fluid is not visualized. A neoplastic etiology is not excluded. Of note, there was a similar finding of the contralateral right breast on 09/16/2023. Clinical  correlation and further evaluation in the Breast Center is recommended.\"     Erythema receding, breast soft without induration, no purulent " discharge, minimal tenderness.   Continue Unasyn until 1400 today, then discharge on 10 day course of Augmentin + Doxycycline  Follow up with PCP in one week. Consider abbreviated course of 7 days if quick resolution of symptoms.   BCx NGTD  Outpatient follow up with general surgery. Discussed with Dr. Lisa Zepeda  .  Recent Labs   Lab 11/11/23  0707 11/10/23  1846   WBC 10.2 15.7*         Hypoglycemia, concern for hypoglycemic unawareness   Acute metabolic encephelopathy  Diabetes mellitus, type 1, A1c 7.1 on 11/10  * Patient works with endocrinologist and has been using NPH and novolog at home but due to labile sugars with some los, she is being transitioned to a pump by her endocrinologist. She plans to start this tomorrow.   * In the ER waiting area, patient was confused, and reports that attempted to remove IV. Found to be hypoglcemia, and was started on D5 fluid after persistant hypoglycemia despite a D50 amp being given. Patient stated, that once ever she is under stress, be it mentally or physiologically such as during a infection, she becomes hypoglyemic. States she had a total of 36u of insulin by the time of lunch, did not have dinner.  * Of note, patient states she does not remember the ER episode, and states lately her body has sometimes failed to react to hypoglycemic episodes suggesting that the patient is developing hypoglycemia unawareness. With normalization of blood sugar in ED, encephalopathy completely resolved. She was Aox4, no focal deficits, back at baseline per daughter.  * Hypoglycemic episode likely due to patient missing dinner, and not due to over use of insulin     On admission, she received 21 units novolog with night time meal and 5 units of lantus at HS.   BS AM of discharge was 336 >   Give 15 units NPH (Patient normally takes 15-20 units NPH at home in AM, 5 units in PM and)    Continued 1 unit / 10 g carb with meals and allowed patient to adjust meal time novolog and she  remained in hospital 4 hours post dose to ensure not low.    Continue medium correction dose insulin  She is getting set up with continuous glucose monitoring and insulin pump through her endocrinologist tomorrow.   Discussed that given her hypoglycemic awareness with loss of consciousness I advise she avoid driving until no further episodes x3 months. She expressed understanding.     Recent Labs   Lab 11/11/23  1633 11/11/23  1235 11/11/23  0948 11/11/23  0707 11/11/23  0623 11/11/23  0458   * 296* 336* 217* 182* 265*         Depression and anxiety  Continue home citalopram     Hypertension  Chronic lower extremity edema  Continue home lisinopril , lasix  PRN labetalol     Hyperlipidemia  Resumed home lipitor     Chronic Hypokalemia  * On lasix + PRN hydrochlorothiazide dose at home. History of recurring low K levels in ambulatory setting.      Resolved with replacement here.   Discharge on 20 meq K with additional dose on days she takes hydrochlorothiazide  Consider spironolactone in the future, but I am not going to start while having above breast issues. Defer to PCP      Hyperparathyroidism   Hypercalcemia  Multinodular goiter  Vitamin D deficiency  * follows with endocrine  * Calcium 11.4 which is similar to baseline at admission > 10.4  S/p IVF  resume vit D repletion on discharge     Liver enzyme elevation, without fever or RUQ pain, nl bilirubin to suggest hepaitits or gallbladder disease.   This may be secondary to fatty liver disease or acute illness. No excessive tylenol or recent new drugs.   ALT < 3x normal so continue statin, unlikely culprit  Follow up with liver panel with PCP.   Discussed weight loss.           Recent Labs   Lab 11/11/23  0707 11/10/23  1846   ALBUMIN 3.4*  --    BILITOTAL 0.4  --    ALT 97*  --    AST 68*  --    ALKPHOS 227*  --    CR 0.66 0.68         Obesity, stage III   Referral placed for medical mgt weight loss program. She does not want to consider bariatric surgery.      Consultations This Hospital Stay   PHARMACY IP CONSULT    Code Status   Full Code    Time Spent on this Encounter   I, Janet Cheema MD, personally saw the patient today and spent greater than 30 minutes discharging this patient.       Janet Cheema MD  [facility]TEND[facility]OV[facility] SHORT STAY  [address] [address]  Phone: [phone]  ______________________________________________________________________    Physical Exam   Vital Signs: Temp: 97.9  F (36.6  C) Temp src: Oral BP: 120/56 Pulse: 99   Resp: 18 SpO2: 95 % O2 Device: None (Room air)    Weight: 290 lbs 0 oz  Constitutional: NAD,   Neuropsyche:  alert and oriented, answers questions appropriately.   Respiratory:      Breathing comfortably, good air exchange, no wheezes, no crackles.   Cardiovascular: Regular rate and rhythm, no edema.  GI:  soft, NT/ND, BS normal  Skin/Integumen: Left breast shows 4 cm area of mild erythema in inferior breast,  no longer able to express pus from small area of ulceration. Not indurated, minimal tenderness.  No acute rash or sign of bleeding.        Primary Care Physician   [facility]    Discharge Orders      Follow-up and recommended labs and tests     Please call Surgery clinic to schedule an appt with Dr. Lisa Valenzuela for recurrent mastitis. [phone]     Reason for your hospital stay    You were admitted with recurring mastitis of the R breast. No evidence of abscess was seen on ultrasound. Antibiotics were prescribed for 10 days.     Follow-up and recommended labs and tests     Follow up with primary care provider, [facility], within 7 days to evaluate medication change and for hospital follow- up.  The following labs/tests are recommended: complete metabolic panel.     Activity    Your activity upon discharge: activity as tolerated     Diet    Follow this diet upon discharge: Orders Placed This  Encounter      Moderate Consistent Carb (60 g CHO per Meal) Diet       Significant Results and Procedures   Most Recent 3 CBC's:  Recent Labs   Lab Test 11/11/23  0707 11/10/23  1846 09/16/23  1958   WBC 10.2 15.7* 15.6*   HGB 12.4 13.8 13.6   MCV 98 94 92    377 305     Most Recent 3 BMP's:  Recent Labs   Lab Test 11/11/23  1633 11/11/23  1235 11/11/23  0948 11/11/23  0707 11/10/23  1953 11/10/23  1846 09/16/23  1958   NA  --   --   --  137  --  139 137   POTASSIUM  --   --   --  4.0  --  3.0* 3.3*   CHLORIDE  --   --   --  101  --  99 98   CO2  --   --   --  25  --  29 27   BUN  --   --   --  15.2  --  16.6 12.9   CR  --   --   --  0.66  --  0.68 0.69   ANIONGAP  --   --   --  11  --  11 12   VY  --   --   --  10.2*  --  11.4* 11.1*   * 296* 336* 217*   < > 26* 140*    < > = values in this interval not displayed.     Most Recent 2 LFT's:  Recent Labs   Lab Test 11/11/23 0707 09/16/23 1958   AST 68* 56*   ALT 97* 40   ALKPHOS 227* 194*   BILITOTAL 0.4 0.4   ,   Results for orders placed or performed during the hospital encounter of 11/10/23   US Breast Left Limited 1-3 Quadrants    Narrative    PRELIMINARY REPORT    EXAM: ULTRASOUND BREAST UNILATERAL LEFT LIMITED  LOCATION: [facility]  DATE: 11/10/2023    INDICATION: Redness, pain, and drainage of the inferior left breast.    COMPARISON: Mammogram 01/08/2021. Right breast ultrasound 09/16/2023.    ULTRASOUND FINDINGS: Targeted ultrasound evaluation was performed at the site of redness, pain, and drainage of the left breast at the 5 o'clock position. Distance from the nipple was not recorded by the sonographer. At this site, there is a 4.6 x 3.6 x   0.8 cm ovoid area of abnormal hypoechoic texture involving the skin and subcutaneous fat with some peripheral increased color Doppler vascularity. A discrete drainable pocket of fluid is not visualized. Of note, there was a similar finding of the   contralateral right breast on  the prior study of 09/16/2023.      Impression    IMPRESSION: Findings are suggestive of left breast cellulitis. A discrete drainable pocket of fluid is not visualized. A neoplastic etiology is not excluded. Of note, there was a similar finding of the contralateral right breast on 09/16/2023. Clinical   correlation and further evaluation in the Breast Center is recommended.    I discussed the findings with Dr. Sosa of the ED at 10:30 PM on 11/10/2023.    ACR BI-RADS Category 3: Probably Benign.    Preliminary Interpretation By: Remy Sharpe MD  Date: 11/10/2023.       Discharge Medications   Discharge Medication List as of 11/11/2023  4:52 PM        START taking these medications    Details   amoxicillin-clavulanate (AUGMENTIN) 875-125 MG tablet Take 1 tablet by mouth 2 times daily for 10 days, Disp-20 tablet, R-0, E-Prescribe      doxycycline hyclate (VIBRAMYCIN) 100 MG capsule Take 1 capsule (100 mg) by mouth 2 times daily for 10 days, Disp-20 capsule, R-0, E-Prescribe      potassium chloride ER (K-TAB) 20 MEQ CR tablet Take 1 tablet (20 mEq) by mouth daily, Disp-30 tablet, R-0, E-Prescribe           CONTINUE these medications which have CHANGED    Details   hydrochlorothiazide (HYDRODIURIL) 50 MG tablet Take 1 tablet (50 mg) by mouth daily (If you take both lasix and hydrochlorothiazide, take additional tablet of potassium that day to equal 2 tablets)., Historical           CONTINUE these medications which have NOT CHANGED    Details   atorvastatin (LIPITOR) 20 MG tablet Take 40 mg by mouth daily, Historical      citalopram (CELEXA) 40 MG tablet Take 40 mg by mouth daily, Historical      furosemide (LASIX) 20 MG tablet Take 20 mg by mouth daily, R-1, Historical      Insulin Lispro (HUMALOG KWIK PEN) 200 UNIT/ML soln Inject Subcutaneous 3 times daily (before meals), Historical      insulin  UNIT/ML vial Inject Subcutaneous 2 times daily, Historical      lisinopril (ZESTRIL) 2.5 MG tablet Take 5 mg by  mouth daily, Historical      vitamin D3 (CHOLECALCIFEROL) 50 mcg (2000 units) tablet Take 1 tablet by mouth daily, Historical           Allergies   Allergies   Allergen Reactions    Pravastatin      Other reaction(s): Myalgia    Soybean Oil      Other reaction(s): GI Upset

## 2023-11-11 NOTE — PROVIDER NOTIFICATION
"MD Notification    Notified Person: MD    Notified Person Name: Matthew Henriquez     Notification Date/Time: 11/11/23  6:29 AM    Notification Interaction: vocera text    Purpose of Notification: \". pt has 5 units insulin ordered now and breakfast insulin in 1 hour. Want 5 units given still? Thanks\"    Orders Received: awaiting response    Comments:    "

## 2023-11-11 NOTE — H&P
Assessment/Plan    53F hx of morbid obesity, T1D, hyperparathyroidism, anxiety, HTN, HLD presenting with L breast purulent cellulitis, accompanied with an episode of refractory hypoglycemia.     Left purulent breast cellulitis  Hx of R purulent breast cellulitis   Leukocytosis  ``Hx: Patient presents with R sided breast pain/drainage that started 1 day ago, associated with erythema that started 4 days ago. Patietn states that the drainage is pus/bloody. Patient denies being on steroids/recent travel/fevers/chills. Last month, patient had similar episode in the R breast, that resolved following oral abx (patient AMAd and had to wait 1 week afterwards before starting her abx regimen). Blood cultures were negative and patient received vancomcyin  ``Vitals/Exam: [id number], L inferior breast warm, erythamous, with peau d'orange skin, open ulceration, no active bleeding/pus but dried blood is present  ``Labs: K 3, Ca 11.4, Glucose 26, WBC 15.7, lactate negative  ``Imaging: prelim US result> Findings are suggestive of left breast cellulitis. A discrete drainable pocket of fluid is not visualized. A neoplastic etiology is not excluded   ``ER Course: Unasyn  --CBC in AM  --C/w unasyn for anerobic coverage, start doxy given recent IV abx to cover MRSA and pus.   --Obtain fluid cultures if abscess begins to rexpress pus  --IVF  --followup final US breast results  --Follow blood cultures  --No indication for ID consult  --Outpatient surgery followup (Note, US shows no definitive evidence of abscess, be it on the L breast, or in the prior ER presentation 1 month ago in the R breast)  --Outpatient bariatric followup. Recurrent celluilitis, at the inferior breast, likely 2/2 obesity resulting in breast friction with the abdomen, +/- uncontrolled diabetes.      Refractory Hypoglycemia  Acute metabolic encephelopathy  Diabetes mellitus, type 1  ``HgbA1c 6.7% 8/17/23. At home takes extremely labile doses of humalog and NPH given labile  blood sugar levels. Patient could not present an average dose.   ``In the ER waiting area, patient was confused, and reports that attempted to remove IV. Found to be hypoglcemia, and was started on D5 fluid after persistant hypoglycemia despite a D50 amp being given. Patient stated, that once ever she is under stress, be it mentally or physiologically such as during a infection, she becomes hypoglyemic. States she had a total of 36u of insulin by the time of lunch, did not have dinner.  ``Of note, patient states she does not remember the ER episode, and states lately her body has sometimes failed to react to hypoglycemic episodes suggesting that the patient is developing hypoglycemia unawareness.   ``By the time nocturnist evaluated the patient, patient was Aox4, no focal deficits, back at baseline per daughter.  ``Hypoglycemic episode likely due to patient missing dinner, and not due to over use of insulin  - ISS only with hypoglycemia protocol,   --Hold off on resuming standing insulin doses , be it her boluses or basal, given hypoglycemia  - Patient to begin insulin pump in 2 days  - Moderate CHO diet  --Switch to normal saline, once FS becomes over 180  --A1C       Depression and anxiety  - Resumed home citalopram     Hypertension  Chronic lower extremity edema  - Resumed home lisinopril   - Resumed home lasix  --PRN labetalol     Hyperlipidemia  - Resumed home lipitor     Chronic Hypokalemia  ``On lasix  --RN based repletion  --Consider switching lasix to K sparin diuretic given chronic hypokalemia (likely 2/2 large home insulin use)    Hyperparathyroidism   Hypercalcemia  Multinodular goiter  Vitamin D deficiency  ``follows with endocrine  ``Calcium 11.4 which is similar to baseline  --IVF, repeat BMP in AM, outpt Endo  --resume vit D repletion on discharge     Obesity  --Recommed bariatric outpt appt be made before discharge. This patient has a high risk of mortality. Insulin use can be greatly decreased if  "bastric surgery/medications were started, and her risk of infections would also decrease     Supportive Care  DVT ppx: ambulation  Diet: mod cho  Pain Control: tylenol  Upper GI ppx: zofran  Lower GI ppx: senna  Lines/Catheters: iv   TTE/Dopplers: none  Contact Precautions: none  PT/OT/Social/Wound/Palliative Consults: none  Code Status: Full    History of Present Illness  Subjective:  Patient presents with R sided breast pain/drainage that started 1 day ago, associated with erythema that started 4 days ago. Patietn states that the drainage is pus/bloody. Patient denies being on steroids/recent travel/fevers/chills. Last month, patient had similar episode in the R breast, that resolved following oral abx (patient AMAd and had to wait 1 week afterwards before starting her abx regimen). Blood cultures were negative and patient received vancomcyin    ROS: 10 point ROS neg other than the symptoms noted above in the HPI.     Environment/ADLs: Independant    Physical Exam  BP (!) 154/76   Pulse 90   Temp 97.6  F (36.4  C) (Temporal)   Resp 20   Ht 1.676 m (5' 6\")   Wt 131.5 kg (290 lb)   LMP 12/24/2017 (Exact Date)   SpO2 99%   BMI 46.81 kg/m      Constitutional: Alert and oriented to person, place and time; no apparent distress. Obese  HEENT: Normocephalic, no scleral icterus  Abdomen: soft, no distention/tenderness/guarding  Lungs: lungs clear to auscultation bilaterally  Heart: Regular s1s2, no evidence of murmurs  Extremities/Neuro:No focal strength/sensation deficits, LE edema bilaterally  Skin: L inferior breast warm, erythamous, with peau d'orange skin, open ulceration, no active bleeding/pus but dried blood is present  Psychiatric: appropriate affect, insight and judgment    I have personally spent 81 minutes total time today in preparing to see the patient (eg, review of tests), obtaining and/or reviewing separately obtained history, performing a medically appropriate examination and/or evaluation, " counseling and educating the patient/family/caregiver, ordering medications, tests, or procedures, referring and communicating with other health care professionals, documenting clinical information in the electronic or other health record, independently interpreting results and communicating results to the patient/ family/caregiver and care coordination.    EMR History    Past Medical Hx:   Past Medical History:   Diagnosis Date    Anxiety and depression     Benign essential hypertension     Diabetes mellitus type 1 (H)     Hyperlipidemia     Hyperparathyroidism (H)     Morbid obesity (H)     Multinodular goiter     Papanicolaou smear of cervix with low grade squamous intraepithelial lesion (LGSIL) 01/16/2015    1/16/15 LSIL 10/16/15 NIL/Neg HPV 5/31/17 NIL/Neg HPV    Vitamin D deficiency         Home Medications: Present in Med Rec    Allergies:   Allergies   Allergen Reactions    Pravastatin      Other reaction(s): Myalgia    Soybean Oil      Other reaction(s): GI Upset        Pertinent Family Hx: No family history on file.     Surgical Hx: No past surgical history on file.     Substance Hx:   History   Drug Use No   ,  Social History    Substance and Sexual Activity      Alcohol use: Yes        Comment: rarely  ,   History   Smoking Status    Never   Smokeless Tobacco    Never   ,   History   Sexual Activity    Sexual activity: Not Currently    Partners: Male              Imaging/Labs    Imaging: US Breast Left Limited 1-3 Quadrants    Result Date: 11/10/2023  PRELIMINARY REPORT EXAM: ULTRASOUND BREAST UNILATERAL LEFT LIMITED LOCATION: [facility] DATE: 11/10/2023 INDICATION: Redness, pain, and drainage of the inferior left breast. COMPARISON: Mammogram 01/08/2021. Right breast ultrasound 09/16/2023. ULTRASOUND FINDINGS: Targeted ultrasound evaluation was performed at the site of redness, pain, and drainage of the left breast at the 5 o'clock position. Distance from the nipple was not  recorded by the sonographer. At this site, there is a 4.6 x 3.6 x 0.8 cm ovoid area of abnormal hypoechoic texture involving the skin and subcutaneous fat with some peripheral increased color Doppler vascularity. A discrete drainable pocket of fluid is not visualized. Of note, there was a similar finding of the contralateral right breast on the prior study of 09/16/2023.     IMPRESSION: Findings are suggestive of left breast cellulitis. A discrete drainable pocket of fluid is not visualized. A neoplastic etiology is not excluded. Of note, there was a similar finding of the contralateral right breast on 09/16/2023. Clinical correlation and further evaluation in the Breast Center is recommended. I discussed the findings with Dr. Sosa of the ED at 10:30 PM on 11/10/2023. ACR BI-RADS Category 3: Probably Benign. Preliminary Interpretation By: Remy Sharpe MD Date: 11/10/2023.         Recent Labs   Lab Test 11/10/23  2259 11/10/23  2224 11/10/23  1953 11/10/23  1846 09/16/23  1958   NA  --   --   --  139 137   POTASSIUM  --   --   --  3.0* 3.3*   CHLORIDE  --   --   --  99 98   CO2  --   --   --  29 27   ANIONGAP  --   --   --  11 12   * 125*   < > 26* 140*   BUN  --   --   --  16.6 12.9   CR  --   --   --  0.68 0.69   VY  --   --   --  11.4* 11.1*    < > = values in this interval not displayed.     CBC RESULTS:   Recent Labs   Lab Test 11/10/23  1846   WBC 15.7*   RBC 4.38   HGB 13.8   HCT 41.1   MCV 94   MCH 31.5   MCHC 33.6   RDW 13.0         Liver Function Studies -   Recent Labs   Lab Test 09/16/23 1958   PROTTOTAL 6.9   ALBUMIN 4.1   BILITOTAL 0.4   ALKPHOS 194*   AST 56*   ALT 40      Troponin I ES   Date Value Ref Range Status   06/17/2014 <0.012 0.000 - 0.034 ug/L Final

## 2023-11-11 NOTE — PLAN OF CARE
Goal Outcome Evaluation:    Top portion must ALWAYS be completed  PRIMARY Concern: L breast cellulitis, hypoglycemia  SAFETY RISK Concerns (fall risk, behaviors, etc.): none      Isolation/Type: none  Tests/Procedures for NEXT shift: AM labs  Consults? (Pending/following, signed-off?) none  Where is patient from? (Home, TCU, etc.): home  Other Important info for NEXT shift:   Anticipated DC date & active delays: 1-2 days  _____________________________________________________________________________  SUMMARY NOTE:              Orientation/Cognitive: A&Ox4  Observation Goals (Met/ Not Met): not met  Mobility Level/Assist Equipment: ind  Antibiotics & Plan (IV/po, length of tx left): unasyn IV, doxycycline IV  Pain Management: declined PRNs  Tele/VS/O2: VSS on RA. HTN in ED  ABNL Lab/BG: K 3, replaced and recheck in AM. Blood sugars initially 26 in ED, overcorrected to 385 at max, now 265  Diet: mod CHO  Bowel/Bladder: continent  Skin Concerns: L breast cellulitis, scabs to toes, L ankle bruising.   Drains/Devices: 2PIVs, one PIV infusing NS at 75mL/hr. Other PIV SL  Patient Stated Goal for Today: blood sugar controlled    Observation goals  PRIOR TO DISCHARGE       Comments: -diagnostic tests and consults completed and resulted: not met  -vital signs normal or at patient baseline: met  -safe disposition plan has been identified: met  Nurse to notify provider when observation goals have been met and patient is ready for discharge.

## 2023-11-15 LAB
BACTERIA BLD CULT: NO GROWTH
BACTERIA BLD CULT: NO GROWTH
BASOPHILS # BLD MANUAL: 0.2 10E3/UL (ref 0–0.2)
BASOPHILS NFR BLD MANUAL: 1 %
EOSINOPHIL # BLD MANUAL: 0.2 10E3/UL (ref 0–0.7)
EOSINOPHIL NFR BLD MANUAL: 1 %
LYMPHOCYTES # BLD MANUAL: 6.6 10E3/UL (ref 0.8–5.3)
LYMPHOCYTES NFR BLD MANUAL: 42 %
MONOCYTES # BLD MANUAL: 1.1 10E3/UL (ref 0–1.3)
MONOCYTES NFR BLD MANUAL: 7 %
NEUTROPHILS # BLD MANUAL: 7.7 10E3/UL (ref 1.6–8.3)
NEUTROPHILS NFR BLD MANUAL: 49 %
PATH REV: ABNORMAL
PLAT MORPH BLD: ABNORMAL
RBC MORPH BLD: ABNORMAL

## 2024-02-05 ENCOUNTER — TRANSCRIBE ORDERS (OUTPATIENT)
Dept: SLEEP MEDICINE | Facility: CLINIC | Age: 54
End: 2024-02-05

## 2024-02-05 DIAGNOSIS — Z13.21 SCREENING FOR MALNUTRITION: Primary | ICD-10-CM

## 2024-05-22 ENCOUNTER — OFFICE VISIT (OUTPATIENT)
Dept: SLEEP MEDICINE | Facility: CLINIC | Age: 54
End: 2024-05-22
Attending: PSYCHIATRY & NEUROLOGY
Payer: COMMERCIAL

## 2024-05-22 VITALS
WEIGHT: 293 LBS | HEIGHT: 67 IN | DIASTOLIC BLOOD PRESSURE: 87 MMHG | HEART RATE: 72 BPM | SYSTOLIC BLOOD PRESSURE: 122 MMHG | OXYGEN SATURATION: 93 % | BODY MASS INDEX: 45.99 KG/M2

## 2024-05-22 DIAGNOSIS — R29.818 SUSPECTED SLEEP APNEA: Primary | ICD-10-CM

## 2024-05-22 DIAGNOSIS — E66.01 OBESITY, MORBID, BMI 50 OR HIGHER (H): ICD-10-CM

## 2024-05-22 DIAGNOSIS — F09 COGNITIVE DYSFUNCTION: ICD-10-CM

## 2024-05-22 DIAGNOSIS — R06.83 SNORING: ICD-10-CM

## 2024-05-22 PROCEDURE — 99203 OFFICE O/P NEW LOW 30 MIN: CPT | Performed by: INTERNAL MEDICINE

## 2024-05-22 ASSESSMENT — SLEEP AND FATIGUE QUESTIONNAIRES
HOW LIKELY ARE YOU TO NOD OFF OR FALL ASLEEP WHILE SITTING QUIETLY AFTER LUNCH WITHOUT ALCOHOL: SLIGHT CHANCE OF DOZING
HOW LIKELY ARE YOU TO NOD OFF OR FALL ASLEEP WHILE SITTING AND READING: WOULD NEVER DOZE
HOW LIKELY ARE YOU TO NOD OFF OR FALL ASLEEP WHEN YOU ARE A PASSENGER IN A CAR FOR AN HOUR WITHOUT A BREAK: SLIGHT CHANCE OF DOZING
HOW LIKELY ARE YOU TO NOD OFF OR FALL ASLEEP IN A CAR, WHILE STOPPED FOR A FEW MINUTES IN TRAFFIC: WOULD NEVER DOZE
HOW LIKELY ARE YOU TO NOD OFF OR FALL ASLEEP WHILE SITTING AND TALKING TO SOMEONE: WOULD NEVER DOZE
HOW LIKELY ARE YOU TO NOD OFF OR FALL ASLEEP WHILE LYING DOWN TO REST IN THE AFTERNOON WHEN CIRCUMSTANCES PERMIT: MODERATE CHANCE OF DOZING
HOW LIKELY ARE YOU TO NOD OFF OR FALL ASLEEP WHILE WATCHING TV: SLIGHT CHANCE OF DOZING
HOW LIKELY ARE YOU TO NOD OFF OR FALL ASLEEP WHILE SITTING INACTIVE IN A PUBLIC PLACE: WOULD NEVER DOZE

## 2024-05-22 NOTE — PROGRESS NOTES
Sleep Consultation:    Date on this visit: 5/22/2024    Genesis Carnes  is referred by Dread Basurto for a sleep consultation.     Primary Physician: Clinic, [facility] [place]     Genesis Carnes reports frequent snoring, gasping, and poor quality of sleep for many years.     Her medical history is significant for diabetes mellitus, type I  and hypothyroidism.     Patient reports that she has been experiencing memory problems recently.  She consulted Dr. Basurto in Neurology for an assessment.  Neuroimaging was noncontributory, and Dr. Basurto referred for a sleep evaluation.    Genesis does snore frequently. Patient does not have a bed partner, but her children have told her that she has loud snoring and gasping. She does have witnessed apneas. Patient sleeps on her side and occasionally on her side. She has occasional morning dry mouth, denies no morning headaches and restless legs.     Genesis goes to sleep at 11:30 PM during the week. She wakes up at 7:30 AM. She falls asleep in 10 minutes.  Genesis denies difficulty falling asleep.  She wakes up 0- 1 times a night for 10 minutes before falling back to sleep.  Genesis wakes up to go to the bathroom.  On weekends, Genesis goes to sleep at 12:00 AM.  She wakes up at 8:00 AM. She falls asleep in 10 minutes.  Patient gets an average of 7 hours of sleep per night.     Genesis denies any sleep walking, sleep talking, dream enactment, sleep paralysis, cataplexy, and hypnogogic/hypnopompic hallucinations.    Genesis denies difficulty breathing through her nose.      Patient's [place] Sleepiness score 5/24 consistent with no daytime sleepiness.      Genesis naps 1-2 times per week for 20-30 minutes, feels refreshed after naps. She takes no inadvertant naps.  She denies closing eyes, dozing, and falling asleep while driving. Patient was counseled on the importance of driving while alert, to pull over if drowsy, or nap before getting into the vehicle if sleepy.  "     She uses 1 sodas/day. Last caffeine intake is usually before noon.    Problem List:  Patient Active Problem List    Diagnosis Date Noted    Hypoglycemia 11/10/2023     Priority: Medium    Abscess of left breast 11/10/2023     Priority: Medium    Cellulitis of female breast 11/10/2023     Priority: Medium    Abscess of right breast 09/16/2023     Priority: Medium    Morbid obesity (H) 07/25/2019     Priority: Medium        Past Medical/Surgical History:  Past Medical History:   Diagnosis Date    Anxiety and depression     Benign essential hypertension     Diabetes mellitus type 1 (H)     Hyperlipidemia     Hyperparathyroidism (H)     Morbid obesity (H)     Multinodular goiter     Papanicolaou smear of cervix with low grade squamous intraepithelial lesion (LGSIL) 01/16/2015    1/16/15 LSIL 10/16/15 NIL/Neg HPV 5/31/17 NIL/Neg HPV    Vitamin D deficiency      Social History     Tobacco Use    Smoking status: Never    Smokeless tobacco: Never   Substance Use Topics    Alcohol use: Yes     Comment: rarely    Drug use: No     Physical Examination:  Vitals: BP (!) 144/83   Pulse 72   Ht 1.702 m (5' 7\")   Wt 145.6 kg (321 lb)   LMP 12/24/2017 (Exact Date)   SpO2 93%   BMI 50.28 kg/m    BMI= Body mass index is 50.28 kg/m .  GENERAL APPEARANCE: healthy, alert, and no distress  HENT: oropharynx crowded  NEURO: mentation intact and speech normal  PSYCH: mentation appears normal and affect normal/bright  Mallampati Class: IV.  Tonsillar Stage: 1  hidden by pillars.    Impression/Plan:    Probable obstructive sleep apnea  Cognitive dysfunction  Severe obesity    Patient is a 54 years old female, with a BMI of 50, medical comorbidity of diabetes, hypothyroidism, who presents with a history of frequent loud snoring, witnessed apneas, poor sleep quality and cognitive dysfunction.  Oropharynx Mallampati class IV examination.  There is intermediate to high risk for obstructive sleep apnea.  Severe obesity places her at risk " for sleep associated hypoventilation.  CO2 on last BMP was 25.  In the clinical setting, I recommend a PSG with transcutaneous CO2 monitoring for assessment of sleep apnea and sleep associated hypoventilation.    Plan:     Split night PSG with TCM       She will follow up with me in approximately two weeks after her sleep study has been competed to review the results and discuss plan of care.       Polysomnography reviewed.  Obstructive sleep apnea reviewed.  Complications of untreated sleep apnea were reviewed.    Dr. Tito Rodriguez       CC: Dread Basurto

## 2024-05-22 NOTE — NURSING NOTE
"Chief Complaint   Patient presents with    Sleep Problem     Possible sleep apnea, stop breathing in sleep , snoring       Initial BP (!) 144/83   Pulse 72   Ht 1.702 m (5' 7\")   Wt 145.6 kg (321 lb)   LMP 12/24/2017 (Exact Date)   SpO2 93%   BMI 50.28 kg/m   Estimated body mass index is 50.28 kg/m  as calculated from the following:    Height as of this encounter: 1.702 m (5' 7\").    Weight as of this encounter: 145.6 kg (321 lb).    Medication Reconciliation: complete  ESS   Neck circumference: 50 centimeters.  Namita Benton MA   "